# Patient Record
Sex: MALE | Race: WHITE | NOT HISPANIC OR LATINO | URBAN - NONMETROPOLITAN AREA
[De-identification: names, ages, dates, MRNs, and addresses within clinical notes are randomized per-mention and may not be internally consistent; named-entity substitution may affect disease eponyms.]

---

## 2020-04-27 ENCOUNTER — LAB REQUISITION (OUTPATIENT)
Dept: LAB | Facility: HOSPITAL | Age: 25
End: 2020-04-27

## 2020-04-27 DIAGNOSIS — R50.9 FEVER, UNSPECIFIED: ICD-10-CM

## 2020-04-27 PROCEDURE — U0002 COVID-19 LAB TEST NON-CDC: HCPCS | Performed by: INTERNAL MEDICINE

## 2020-04-28 LAB
REF LAB TEST METHOD: NORMAL
SARS-COV-2 RNA RESP QL NAA+PROBE: NOT DETECTED

## 2022-01-20 ENCOUNTER — TELEPHONE (OUTPATIENT)
Dept: EMERGENCY DEPT | Facility: HOSPITAL | Age: 27
End: 2022-01-20

## 2022-01-20 ENCOUNTER — HOSPITAL ENCOUNTER (EMERGENCY)
Facility: HOSPITAL | Age: 27
Discharge: HOME | End: 2022-01-20
Attending: EMERGENCY MEDICINE | Admitting: EMERGENCY MEDICINE

## 2022-01-20 ENCOUNTER — APPOINTMENT (OUTPATIENT)
Dept: GENERAL RADIOLOGY | Facility: HOSPITAL | Age: 27
End: 2022-01-20

## 2022-01-20 VITALS
DIASTOLIC BLOOD PRESSURE: 73 MMHG | OXYGEN SATURATION: 94 % | HEIGHT: 68 IN | WEIGHT: 270 LBS | RESPIRATION RATE: 20 BRPM | BODY MASS INDEX: 40.92 KG/M2 | TEMPERATURE: 98.7 F | HEART RATE: 75 BPM | SYSTOLIC BLOOD PRESSURE: 96 MMHG

## 2022-01-20 DIAGNOSIS — R11.2 NON-INTRACTABLE VOMITING WITH NAUSEA, UNSPECIFIED VOMITING TYPE: ICD-10-CM

## 2022-01-20 DIAGNOSIS — R07.9 CHEST PAIN, UNSPECIFIED TYPE: Primary | ICD-10-CM

## 2022-01-20 DIAGNOSIS — E87.6 HYPOKALEMIA: ICD-10-CM

## 2022-01-20 LAB
ALBUMIN SERPL-MCNC: 4.5 G/DL (ref 3.5–5.2)
ALBUMIN/GLOB SERPL: 1.2 G/DL
ALP SERPL-CCNC: 78 U/L (ref 39–117)
ALT SERPL W P-5'-P-CCNC: 12 U/L (ref 1–41)
ANION GAP SERPL CALCULATED.3IONS-SCNC: 23.5 MMOL/L (ref 5–15)
AST SERPL-CCNC: 16 U/L (ref 1–40)
BASOPHILS # BLD AUTO: 0.07 10*3/MM3 (ref 0–0.2)
BASOPHILS NFR BLD AUTO: 0.7 % (ref 0–1.5)
BILIRUB SERPL-MCNC: 1 MG/DL (ref 0–1.2)
BUN SERPL-MCNC: 9 MG/DL (ref 6–20)
BUN/CREAT SERPL: 11.8 (ref 7–25)
CALCIUM SPEC-SCNC: 10 MG/DL (ref 8.6–10.5)
CHLORIDE SERPL-SCNC: 90 MMOL/L (ref 98–107)
CO2 SERPL-SCNC: 21.5 MMOL/L (ref 22–29)
CREAT SERPL-MCNC: 0.76 MG/DL (ref 0.76–1.27)
D DIMER PPP FEU-MCNC: 0.27 MCGFEU/ML (ref 0–0.57)
DEPRECATED RDW RBC AUTO: 40.4 FL (ref 37–54)
EOSINOPHIL # BLD AUTO: 0.03 10*3/MM3 (ref 0–0.4)
EOSINOPHIL NFR BLD AUTO: 0.3 % (ref 0.3–6.2)
ERYTHROCYTE [DISTWIDTH] IN BLOOD BY AUTOMATED COUNT: 14 % (ref 12.3–15.4)
FLUAV RNA RESP QL NAA+PROBE: NOT DETECTED
FLUBV RNA RESP QL NAA+PROBE: NOT DETECTED
GFR SERPL CREATININE-BSD FRML MDRD: 124 ML/MIN/1.73
GLOBULIN UR ELPH-MCNC: 3.8 GM/DL
GLUCOSE SERPL-MCNC: 95 MG/DL (ref 65–99)
HCT VFR BLD AUTO: 48.3 % (ref 37.5–51)
HGB BLD-MCNC: 17.2 G/DL (ref 13–17.7)
HOLD SPECIMEN: NORMAL
HOLD SPECIMEN: NORMAL
IMM GRANULOCYTES # BLD AUTO: 0.03 10*3/MM3 (ref 0–0.05)
IMM GRANULOCYTES NFR BLD AUTO: 0.3 % (ref 0–0.5)
LIPASE SERPL-CCNC: 90 U/L (ref 13–60)
LYMPHOCYTES # BLD AUTO: 1.08 10*3/MM3 (ref 0.7–3.1)
LYMPHOCYTES NFR BLD AUTO: 11.4 % (ref 19.6–45.3)
MAGNESIUM SERPL-MCNC: 2 MG/DL (ref 1.6–2.6)
MCH RBC QN AUTO: 29.2 PG (ref 26.6–33)
MCHC RBC AUTO-ENTMCNC: 35.6 G/DL (ref 31.5–35.7)
MCV RBC AUTO: 81.9 FL (ref 79–97)
MONOCYTES # BLD AUTO: 0.97 10*3/MM3 (ref 0.1–0.9)
MONOCYTES NFR BLD AUTO: 10.3 % (ref 5–12)
NEUTROPHILS NFR BLD AUTO: 7.27 10*3/MM3 (ref 1.7–7)
NEUTROPHILS NFR BLD AUTO: 77 % (ref 42.7–76)
NRBC BLD AUTO-RTO: 0 /100 WBC (ref 0–0.2)
PLATELET # BLD AUTO: 278 10*3/MM3 (ref 140–450)
PMV BLD AUTO: 11 FL (ref 6–12)
POTASSIUM SERPL-SCNC: 2.9 MMOL/L (ref 3.5–5.2)
PROT SERPL-MCNC: 8.3 G/DL (ref 6–8.5)
RBC # BLD AUTO: 5.9 10*6/MM3 (ref 4.14–5.8)
SARS-COV-2 RNA RESP QL NAA+PROBE: NOT DETECTED
SODIUM SERPL-SCNC: 135 MMOL/L (ref 136–145)
TROPONIN T SERPL-MCNC: <0.01 NG/ML (ref 0–0.03)
TROPONIN T SERPL-MCNC: <0.01 NG/ML (ref 0–0.03)
WBC NRBC COR # BLD: 9.45 10*3/MM3 (ref 3.4–10.8)
WHOLE BLOOD HOLD SPECIMEN: NORMAL
WHOLE BLOOD HOLD SPECIMEN: NORMAL

## 2022-01-20 PROCEDURE — 84484 ASSAY OF TROPONIN QUANT: CPT | Performed by: PHYSICIAN ASSISTANT

## 2022-01-20 PROCEDURE — 85379 FIBRIN DEGRADATION QUANT: CPT | Performed by: PHYSICIAN ASSISTANT

## 2022-01-20 PROCEDURE — 85025 COMPLETE CBC W/AUTO DIFF WBC: CPT

## 2022-01-20 PROCEDURE — 93005 ELECTROCARDIOGRAM TRACING: CPT

## 2022-01-20 PROCEDURE — 83735 ASSAY OF MAGNESIUM: CPT | Performed by: PHYSICIAN ASSISTANT

## 2022-01-20 PROCEDURE — 93005 ELECTROCARDIOGRAM TRACING: CPT | Performed by: PHYSICIAN ASSISTANT

## 2022-01-20 PROCEDURE — 80053 COMPREHEN METABOLIC PANEL: CPT

## 2022-01-20 PROCEDURE — 96374 THER/PROPH/DIAG INJ IV PUSH: CPT

## 2022-01-20 PROCEDURE — 71045 X-RAY EXAM CHEST 1 VIEW: CPT

## 2022-01-20 PROCEDURE — 25010000002 ONDANSETRON PER 1 MG: Performed by: PHYSICIAN ASSISTANT

## 2022-01-20 PROCEDURE — 36415 COLL VENOUS BLD VENIPUNCTURE: CPT

## 2022-01-20 PROCEDURE — 84484 ASSAY OF TROPONIN QUANT: CPT

## 2022-01-20 PROCEDURE — 99284 EMERGENCY DEPT VISIT MOD MDM: CPT

## 2022-01-20 PROCEDURE — 83690 ASSAY OF LIPASE: CPT | Performed by: PHYSICIAN ASSISTANT

## 2022-01-20 PROCEDURE — 87636 SARSCOV2 & INF A&B AMP PRB: CPT | Performed by: PHYSICIAN ASSISTANT

## 2022-01-20 RX ORDER — ONDANSETRON 2 MG/ML
4 INJECTION INTRAMUSCULAR; INTRAVENOUS ONCE
Status: COMPLETED | OUTPATIENT
Start: 2022-01-20 | End: 2022-01-20

## 2022-01-20 RX ORDER — POTASSIUM CHLORIDE 750 MG/1
40 CAPSULE, EXTENDED RELEASE ORAL ONCE
Status: COMPLETED | OUTPATIENT
Start: 2022-01-20 | End: 2022-01-20

## 2022-01-20 RX ORDER — SODIUM CHLORIDE 0.9 % (FLUSH) 0.9 %
10 SYRINGE (ML) INJECTION AS NEEDED
Status: DISCONTINUED | OUTPATIENT
Start: 2022-01-20 | End: 2022-01-20 | Stop reason: HOSPADM

## 2022-01-20 RX ORDER — ONDANSETRON 4 MG/1
4 TABLET, ORALLY DISINTEGRATING ORAL EVERY 6 HOURS PRN
Qty: 8 TABLET | Refills: 0 | Status: SHIPPED | OUTPATIENT
Start: 2022-01-20 | End: 2022-01-22

## 2022-01-20 RX ORDER — FAMOTIDINE 20 MG/1
20 TABLET, FILM COATED ORAL DAILY PRN
COMMUNITY

## 2022-01-20 RX ORDER — ASPIRIN 325 MG
325 TABLET ORAL ONCE
Status: COMPLETED | OUTPATIENT
Start: 2022-01-20 | End: 2022-01-20

## 2022-01-20 RX ADMIN — SODIUM CHLORIDE 1000 ML: 9 INJECTION, SOLUTION INTRAVENOUS at 14:39

## 2022-01-20 RX ADMIN — ONDANSETRON 4 MG: 2 INJECTION INTRAMUSCULAR; INTRAVENOUS at 14:38

## 2022-01-20 RX ADMIN — POTASSIUM CHLORIDE 40 MEQ: 10 CAPSULE, COATED, EXTENDED RELEASE ORAL at 15:37

## 2022-01-20 RX ADMIN — ASPIRIN 325 MG ORAL TABLET 325 MG: 325 PILL ORAL at 14:38

## 2022-01-20 RX ADMIN — LIDOCAINE HYDROCHLORIDE: 20 SOLUTION ORAL; TOPICAL at 15:39

## 2022-01-20 NOTE — ED PROVIDER NOTES
Subjective   Patient is a 27-year-old male with reported history of scoliosis presenting to the ER for evaluation of chest pain.  Patient states he has had nausea and vomiting for approximately 1 week.  He states that since last night he has had a dull pain with intermittent sharp pains in his left chest that radiates to his left arm and neck.  He states it has been more constant throughout the day while he is been awake.  He states he has been a bit short of breath as well but denies cough.  He denies any fever, chills, headache, syncopal episodes, leg pain or swelling.  He states he has had some abdominal discomfort but no specific focal pain.  Denies any dysuria, hematuria.  He denies any tobacco use.  He states he does have a positive family history of CAD in his father who has had heart attacks before 65.          Review of Systems   Constitutional: Negative for chills and fever.   HENT: Negative.    Eyes: Negative.    Respiratory: Positive for shortness of breath. Negative for cough.    Cardiovascular: Positive for chest pain. Negative for palpitations and leg swelling.   Gastrointestinal: Positive for nausea and vomiting. Negative for abdominal pain.   Genitourinary: Negative.    Musculoskeletal: Negative.    Skin: Negative.    Allergic/Immunologic: Negative for immunocompromised state.   Neurological: Negative.    Psychiatric/Behavioral: Negative.        Past Medical History:   Diagnosis Date   • Scoliosis        Allergies   Allergen Reactions   • Penicillins Rash       Past Surgical History:   Procedure Laterality Date   • EAR TUBES         History reviewed. No pertinent family history.    Social History     Socioeconomic History   • Marital status: Single   Tobacco Use   • Smoking status: Former Smoker   • Smokeless tobacco: Never Used   Vaping Use   • Vaping Use: Never used   Substance and Sexual Activity   • Alcohol use: Not Currently   • Drug use: Never   • Sexual activity: Defer           Objective  "  Physical Exam  Vitals and nursing note reviewed.     BP 96/73   Pulse 75   Temp 98.7 °F (37.1 °C) (Oral)   Resp 20   Ht 172.7 cm (68\")   Wt 122 kg (270 lb)   SpO2 94%   BMI 41.05 kg/m²     GEN: No acute distress, sitting up around the stretcher.  Awake and alert.  Does not appear septic or toxic.  He is answering questions appropriately.  Head: Normocephalic, atraumatic  Eyes: EOM intact  ENT: Mask in place per protocol  Cardiovascular: Mild tachycardia, regular rhythm  Lungs: Clear to auscultation bilaterally without adventitious sounds  Abdomen: Soft, nontender, nondistended, no peritoneal signs, no focal guarding  Extremities: No edema, normal appearance, full range of motion without deficits  Neuro: GCS 15  Psych: Mood and affect are appropriate    Procedures           ED Course  ED Course as of 01/20/22 2218   Thu Jan 20, 2022   1442 WBC: 9.45 [LA]   1442 Hemoglobin: 17.2 [LA]   1442 Platelets: 278 [LA]   1442 Neutrophil Rel %(!): 77.0 [LA]   1442 Lymphocyte Rel %(!): 11.4 [LA]   1458 D-Dimer, Quant: 0.27 [LA]   1501 EKG interpreted by me.  Sinus rhythm.  Tachycardic.  Rate of 105.  Sinus arrhythmia.  Left anterior fascicular block.  Nonspecific ST segment depressions and T wave inversions.  Diffuse.  Nonspecific ST segment abnormalities.  Abnormal EKG [CG]   1501 Magnesium: 2.0 [LA]   1502 Glucose: 95 [LA]   1502 BUN: 9 [LA]   1502 Creatinine: 0.76 [LA]   1502 Sodium(!): 135 [LA]   1502 Potassium(!): 2.9 [LA]   1502 Chloride(!): 90 [LA]   1502 CO2(!): 21.5 [LA]   1502 Calcium: 10.0 [LA]   1502 Total Protein: 8.3 [LA]   1502 Albumin: 4.50 [LA]   1502 ALT (SGPT): 12 [LA]   1502 AST (SGOT): 16 [LA]   1502 Alkaline Phosphatase: 78 [LA]   1502 Total Bilirubin: 1.0 [LA]   1502 eGFR Non  Am: 124 [LA]   1502 Globulin: 3.8 [LA]   1502 A/G Ratio: 1.2 [LA]   1502 BUN/Creatinine Ratio: 11.8 [LA]   1502 Anion Gap(!): 23.5 [LA]   1505 COVID19: Not Detected [LA]   1505 Influenza A PCR: Not Detected [LA] " "  1505 Influenza B PCR: Not Detected [LA]   1523 PROCEDURE: XR CHEST 1 VW-     HISTORY: Chest Pain Triage Protocol     COMPARISON: None.     FINDINGS: The heart is normal in size. The mediastinum is unremarkable.  The lungs are clear. There is no pneumothorax.  There are no acute  osseous abnormalities.     IMPRESSION:  No acute cardiopulmonary process.     Continued followup is recommended.     This report was finalized on 1/20/2022 3:16 PM by Michelle Yo M.D..          Specimen Collected: 01/20/22 15:16         [LA]   1534 Updated patient with findings so far. He is resting comfortably in the stretcher.  [LA]   1709 Lipase(!): 90 [LA]   1709 Troponin T: <0.010 [LA]   1731 Reassessed the patient. Chest pain has improved after medication. Discussed with Dr. Ward. Called Dr. Palacios. He is going to review the EKGs and call me back. [LA]   1746 Talked with Dr. Palacios.  He asked if patient had a D-dimer and then also asked if he had a CT of his abdomen.  He states he has seen this type of EKG with gallbladder pancreatitis. [LA]   1750 Discussed findings with the patient.  He states he actually had a CT scan of his abdomen performed at [place] a few days ago.  He states that they told him there were no abnormalities.  Given this, discussed diet changes, will give Zofran.  Discussed follow-up with cardiology and primary care provider. [LA]   1758 CT scan report from 1/17/2022 from [facility] revealed \"Impression: no acute abnormality\"  [LA]   1800 HEART score is 3, low risk [LA]      ED Course User Index  [CG] Randall Ward B, DO  [LA] Chelsea Szymanski PA-C                                                 MDM  Number of Diagnoses or Management Options  Chest pain, unspecified type  Hypokalemia  Non-intractable vomiting with nausea, unspecified vomiting type  Diagnosis management comments: On arrival, patient has a mildly elevated blood pressure, is afebrile.  Saturating 95% on room air and is mildly " tachycardic at 103.  Differential could include electrolyte abnormalities, dehydration, ACS, cardiac dysrhythmia, pneumonia, and other concerns.  Lower concern for pulmonary embolism but given the tachycardia will obtain D-dimer, basic labs, troponin, magnesium, EKG, chest x-ray.  He could also have some underlying esophagitis from the vomiting.  Will obtain rapid COVID and influenza.  Patient was given aspirin on arrival.  We'll also give IV fluids and Zofran, GI cocktail.    EKG was interpreted by the attending.  Initial troponin negative.  Patient had a mild elevation of lipase at 90 and a mild hypokalemia that was replaced p.o.  There were no other significant electrolyte abnormalities.  Repeat EKG was performed.  Serial troponin remained negative.  Patient's pain did improve somewhat after GI cocktail.  Given the abnormal appearance of patient's EKG, was able to speak with our cardiologist Dr. Palacios and he reviewed the EKG.  He asked about a D-dimer the patient which was negative.  He states he is also seen this with gallstone pancreatitis but did not think there was any further cardiac work-up warranted at this time.  Discussed with the patient and he states he just had a CT scan performed [place] a few days ago.  We obtained outside records of a CT scan with contrast from 1/17/2022 that showed no acute abnormalities.  Given this, believe patient can be discharged with very close follow-up and strict return precautions.  He verbalized understanding and was in agreement with this plan of care.       Amount and/or Complexity of Data Reviewed  Clinical lab tests: reviewed and ordered  Tests in the radiology section of CPT®: reviewed and ordered  Discussion of test results with the performing providers: yes  Review and summarize past medical records: yes  Discuss the patient with other providers: yes    Risk of Complications, Morbidity, and/or Mortality  Presenting problems: moderate  Diagnostic procedures:  moderate  Management options: low    Patient Progress  Patient progress: stable      Final diagnoses:   Chest pain, unspecified type   Non-intractable vomiting with nausea, unspecified vomiting type   Hypokalemia       ED Disposition  ED Disposition     ED Disposition Condition Comment    Discharge Stable           PATIENT CONNECTION - [facility] [address]  [phone]  Schedule an appointment as soon as possible for a visit       Luca Palacios MD  [address] [place] [address] 1  [address]  [facility] [id number] [phone]    Schedule an appointment as soon as possible for a visit   As needed, If symptoms worsen         Medication List      New Prescriptions    ondansetron ODT 4 MG disintegrating tablet  Commonly known as: ZOFRAN-ODT  Place 1 tablet on the tongue Every 6 (Six) Hours As Needed for Nausea or Vomiting for up to 2 days.           Where to Get Your Medications      These medications were sent to [facility] - [place], [place] - [address] - [phone]  - [phone]   [address] [place] [address]    Phone: [phone]   · ondansetron ODT 4 MG disintegrating tablet          Chelsea Szymanski PA-C  01/20/22 2217       Chelsea Szymanski PA-C  01/20/22 2212

## 2022-01-20 NOTE — ED NOTES
At this time BAILEY Tran requested to speak to Dr. Palacios. Call transferred at this time.     Kaylin Sánchez  01/20/22 0156

## 2022-01-20 NOTE — ED NOTES
Pt received discharge instructions and verbalized understanding; Breathing even and non labored with no signs of distress; AOx4; GCS 15; Pt ambulated off unit with steady gait      Violet Ware RN  01/20/22 4163

## 2022-01-20 NOTE — DISCHARGE INSTRUCTIONS
Try to eat a bland diet for the next few days.  Try to avoid greasy, fried, or fatty foods.  Take Zofran as needed for nausea and vomiting.  Follow-up with your primary care provider in the next few days to reevaluate symptoms and ensure you are improving.  Your potassium was a bit low today but this was replaced still need to be rechecked.  You can follow-up with our cardiologist as needed especially symptoms persist or worsen.  They might need to do further evaluation as an outpatient.  Return here to the ER for any change, worsening symptoms, or any additional concerns including but not limited to severe or worsening chest pain, shortness of breath, intractable vomiting, fever greater than 100.4, severe abdominal pain.

## 2022-01-21 NOTE — PROGRESS NOTES
Patient: Milton Taylor    YOB: 1995    Date: 01/24/2022    Primary Care Provider: Lakeshia Yap MD    Chief Complaint   Patient presents with   • Abdominal Pain       SUBJECTIVE:    History of present illness:  I saw the patient in the office today as a consultation for evaluation and treatment of bouts of nausea and vomiting with unexplained weight loss and recent change in bowel habits.  Patient had CT scan of the abdomen and pelvis performed 01/03/2022, it did not show an acute abnormality other than scoliosis. Patient states he has nausea, vomiting, and constipation. He states that he has lost 90 pounds since September. He is unable to keep any food or liquids down with vomiting.    Apparently was seen in the emergency room 4 days ago, he did have a very slightly elevated lipase at that time.  He does complain of sharp discomfort in the midepigastric and right upper quadrant region associated with nausea, present over the past several weeks to months, worse with fatty meals, not relieved, associated with nausea and vomiting, nonradiating.    The following portions of the patient's history were reviewed and updated as appropriate: allergies, current medications, past family history, past medical history, past social history, past surgical history and problem list.    Review of Systems   Constitutional: Positive for unexpected weight change. Negative for chills and fever.   HENT: Negative for trouble swallowing and voice change.    Eyes: Negative for visual disturbance.   Respiratory: Negative for apnea, cough, chest tightness, shortness of breath and wheezing.    Cardiovascular: Negative for chest pain, palpitations and leg swelling.   Gastrointestinal: Positive for constipation, nausea and vomiting. Negative for abdominal distention, abdominal pain, anal bleeding, blood in stool, diarrhea and rectal pain.   Endocrine: Negative for cold intolerance and heat intolerance.   Genitourinary:  "Negative for difficulty urinating, dysuria, flank pain, scrotal swelling and testicular pain.   Musculoskeletal: Negative for back pain, gait problem and joint swelling.   Skin: Negative for color change, rash and wound.   Neurological: Negative for dizziness, syncope, speech difficulty, weakness, numbness and headaches.   Hematological: Negative for adenopathy. Does not bruise/bleed easily.   Psychiatric/Behavioral: Negative for confusion. The patient is not nervous/anxious.        History:  Past Medical History:   Diagnosis Date   • Scoliosis        Past Surgical History:   Procedure Laterality Date   • EAR TUBES         Family History   Problem Relation Age of Onset   • Hyperlipidemia Father    • Heart disease Father    • Cancer Maternal Grandfather         pancreatic       Social History     Tobacco Use   • Smoking status: Former Smoker   • Smokeless tobacco: Never Used   Vaping Use   • Vaping Use: Never used   Substance Use Topics   • Alcohol use: Not Currently   • Drug use: Never       Allergies:  Allergies   Allergen Reactions   • Penicillins Rash       Medications:    Current Outpatient Medications:   •  Famotidine (PEPCID PO), Take  by mouth., Disp: , Rfl:   •  nystatin (MYCOSTATIN) 100,000 unit/mL suspension, take FIVE ML BY MOUTH FOUR TIMES DAILY, Disp: , Rfl:   •  potassium chloride (K-DUR,KLOR-CON) 20 MEQ CR tablet, TAKE ONE TABLET BY MOUTH TWICE DAILY WITH A FULL GLASS OF WATER, Disp: , Rfl:   •  Promethegan 25 MG suppository, insert ONE SUPPOSITORY rectally EVERY 6 HOURS AS NEEDED FOR NAUSEA AND VOMITING, Disp: , Rfl:   •  NYSTATIN PO, Take  by mouth., Disp: , Rfl:   No current facility-administered medications for this visit.    OBJECTIVE:    Vital Signs:   Vitals:    01/24/22 1029   BP: 112/64   Pulse: (!) 124   Resp: 18   Temp: 98 °F (36.7 °C)   TempSrc: Temporal   SpO2: 98%   Weight: 120 kg (265 lb 6.4 oz)   Height: 172.7 cm (68\")       Physical Exam:   General Appearance:    Alert, cooperative, " in no acute distress   Head:    Normocephalic, without obvious abnormality, atraumatic   Eyes:            Lids and lashes normal, conjunctivae and sclerae normal, no   icterus, no pallor, corneas clear, PERRLA   Ears:    Ears appear intact with no abnormalities noted   Throat:   No oral lesions, no thrush, oral mucosa moist   Neck:   No adenopathy, supple, trachea midline, no thyromegaly, no   carotid bruit, no JVD   Lungs:     Clear to auscultation,respirations regular, even and                  unlabored    Heart:    Regular rhythm and normal rate, normal S1 and S2, no            murmur, no gallop, no rub, no click   Chest Wall:    No abnormalities observed   Abdomen:     Normal bowel sounds, no masses, no organomegaly, soft        non-tender, non-distended, no guarding, there is evidence of epigastric  tenderness, no peritoneal signs   Extremities:   Moves all extremities well, no edema, no cyanosis, no             redness   Pulses:   Pulses palpable and equal bilaterally   Skin:   No bleeding, bruising or rash   Lymph nodes:   No palpable adenopathy   Neurologic:   Cranial nerves 2 - 12 grossly intact, sensation intact     Results Review:   I reviewed the patient's new clinical results.  I reviewed the patient's new imaging results and agree with the interpretation.  I reviewed the patient's other test results and agree with the interpretation    Review of Systems was reviewed and confirmed as accurate as documented by the MA.    ASSESSMENT/PLAN:    1. Nausea and vomiting, intractability of vomiting not specified, unspecified vomiting type    2. Epigastric pain    3. Right upper quadrant abdominal pain        I did have a detailed and extensive discussion with the patient and his mother in the office today and I would like for the patient to have repeat laboratory values and a HIDA scan performed and then I want to see him back in the office ASAP.    I discussed the patients findings and my recommendations with  patient.     Electronically signed by Nj De Jesus MD  01/24/22 15:49 EST

## 2022-01-24 ENCOUNTER — LAB (OUTPATIENT)
Dept: LAB | Facility: HOSPITAL | Age: 27
End: 2022-01-24

## 2022-01-24 ENCOUNTER — TELEPHONE (OUTPATIENT)
Dept: SURGERY | Facility: CLINIC | Age: 27
End: 2022-01-24

## 2022-01-24 ENCOUNTER — OFFICE VISIT (OUTPATIENT)
Dept: SURGERY | Facility: CLINIC | Age: 27
End: 2022-01-24

## 2022-01-24 ENCOUNTER — HOSPITAL ENCOUNTER (OUTPATIENT)
Dept: NUCLEAR MEDICINE | Facility: HOSPITAL | Age: 27
Discharge: HOME | End: 2022-01-24

## 2022-01-24 VITALS
HEART RATE: 124 BPM | SYSTOLIC BLOOD PRESSURE: 112 MMHG | RESPIRATION RATE: 18 BRPM | BODY MASS INDEX: 40.22 KG/M2 | DIASTOLIC BLOOD PRESSURE: 64 MMHG | TEMPERATURE: 98 F | OXYGEN SATURATION: 98 % | WEIGHT: 265.4 LBS | HEIGHT: 68 IN

## 2022-01-24 DIAGNOSIS — R11.2 NAUSEA AND VOMITING, INTRACTABILITY OF VOMITING NOT SPECIFIED, UNSPECIFIED VOMITING TYPE: ICD-10-CM

## 2022-01-24 DIAGNOSIS — R10.11 RIGHT UPPER QUADRANT ABDOMINAL PAIN: ICD-10-CM

## 2022-01-24 DIAGNOSIS — R11.2 NAUSEA AND VOMITING, INTRACTABILITY OF VOMITING NOT SPECIFIED, UNSPECIFIED VOMITING TYPE: Primary | ICD-10-CM

## 2022-01-24 DIAGNOSIS — R10.13 EPIGASTRIC PAIN: ICD-10-CM

## 2022-01-24 LAB
ALBUMIN SERPL-MCNC: 4.3 G/DL (ref 3.5–5.2)
ALBUMIN/GLOB SERPL: 1.1 G/DL
ALP SERPL-CCNC: 78 U/L (ref 39–117)
ALT SERPL W P-5'-P-CCNC: 14 U/L (ref 1–41)
ANION GAP SERPL CALCULATED.3IONS-SCNC: 22.1 MMOL/L (ref 5–15)
AST SERPL-CCNC: 24 U/L (ref 1–40)
BILIRUB SERPL-MCNC: 1 MG/DL (ref 0–1.2)
BUN SERPL-MCNC: 8 MG/DL (ref 6–20)
BUN/CREAT SERPL: 10.1 (ref 7–25)
CALCIUM SPEC-SCNC: 10 MG/DL (ref 8.6–10.5)
CHLORIDE SERPL-SCNC: 89 MMOL/L (ref 98–107)
CO2 SERPL-SCNC: 23.9 MMOL/L (ref 22–29)
CREAT SERPL-MCNC: 0.79 MG/DL (ref 0.76–1.27)
DEPRECATED RDW RBC AUTO: 39.6 FL (ref 37–54)
ERYTHROCYTE [DISTWIDTH] IN BLOOD BY AUTOMATED COUNT: 13.8 % (ref 12.3–15.4)
GFR SERPL CREATININE-BSD FRML MDRD: 119 ML/MIN/1.73
GLOBULIN UR ELPH-MCNC: 3.9 GM/DL
GLUCOSE SERPL-MCNC: 94 MG/DL (ref 65–99)
HCT VFR BLD AUTO: 49.1 % (ref 37.5–51)
HGB BLD-MCNC: 17.3 G/DL (ref 13–17.7)
LIPASE SERPL-CCNC: 163 U/L (ref 13–60)
MCH RBC QN AUTO: 28.9 PG (ref 26.6–33)
MCHC RBC AUTO-ENTMCNC: 35.2 G/DL (ref 31.5–35.7)
MCV RBC AUTO: 82.1 FL (ref 79–97)
PLATELET # BLD AUTO: 271 10*3/MM3 (ref 140–450)
PMV BLD AUTO: 11.3 FL (ref 6–12)
POTASSIUM SERPL-SCNC: 3.2 MMOL/L (ref 3.5–5.2)
PROT SERPL-MCNC: 8.2 G/DL (ref 6–8.5)
RBC # BLD AUTO: 5.98 10*6/MM3 (ref 4.14–5.8)
SODIUM SERPL-SCNC: 135 MMOL/L (ref 136–145)
WBC NRBC COR # BLD: 8.1 10*3/MM3 (ref 3.4–10.8)

## 2022-01-24 PROCEDURE — 80053 COMPREHEN METABOLIC PANEL: CPT

## 2022-01-24 PROCEDURE — 36415 COLL VENOUS BLD VENIPUNCTURE: CPT

## 2022-01-24 PROCEDURE — 78227 HEPATOBIL SYST IMAGE W/DRUG: CPT

## 2022-01-24 PROCEDURE — 83690 ASSAY OF LIPASE: CPT

## 2022-01-24 PROCEDURE — A9537 TC99M MEBROFENIN: HCPCS | Performed by: SURGERY

## 2022-01-24 PROCEDURE — 99204 OFFICE O/P NEW MOD 45 MIN: CPT | Performed by: SURGERY

## 2022-01-24 PROCEDURE — 0 TECHNETIUM TC 99M MEBROFENIN KIT: Performed by: SURGERY

## 2022-01-24 PROCEDURE — 85027 COMPLETE CBC AUTOMATED: CPT

## 2022-01-24 RX ORDER — PROMETHAZINE HYDROCHLORIDE 25 MG/1
SUPPOSITORY RECTAL
COMMUNITY
Start: 2022-01-04 | End: 2022-01-25 | Stop reason: ALTCHOICE

## 2022-01-24 RX ORDER — KIT FOR THE PREPARATION OF TECHNETIUM TC 99M MEBROFENIN 45 MG/10ML
1 INJECTION, POWDER, LYOPHILIZED, FOR SOLUTION INTRAVENOUS
Status: COMPLETED | OUTPATIENT
Start: 2022-01-24 | End: 2022-01-24

## 2022-01-24 RX ORDER — POTASSIUM CHLORIDE 20 MEQ/1
TABLET, EXTENDED RELEASE ORAL
COMMUNITY
Start: 2022-01-17

## 2022-01-24 RX ADMIN — MEBROFENIN 1 DOSE: 45 INJECTION, POWDER, LYOPHILIZED, FOR SOLUTION INTRAVENOUS at 12:00

## 2022-01-24 NOTE — TELEPHONE ENCOUNTER
"Per Dr. De Jesus, pt was informed that his hida scan was abnormally low indicating that his gallbladder is not working properly.  Patient asked to stay on \"sips of liquids\" and keep appt for tomorrow.  Patient stated that he would keep appt and he added that he had both Zofran and Phenergan on hand and he has taken the Phenergan and it is helping with his nausea and vomiting.  "

## 2022-01-24 NOTE — H&P (VIEW-ONLY)
Patient: Milton Taylor    YOB: 1995    Date: 01/25/2022    Primary Care Provider: Lakeshia Yap MD    Chief Complaint   Patient presents with   • Follow-up     Hida scan/labs       SUBJECTIVE:    History of present illness:  I saw the patient in the office today as a follow-up consultation for evaluation and treatment of abdominal pain with nausea.  Patient had lab work done 01/24/2022 including lipase which was elevated @ 163 U/L.  CBC and CMP were done also, please see attached documents for complete reports. Hida scan was done 01/24/2022 which showed a 0% ejection fraction.    Apparently the patient has been ill for several days, he has excessive nausea and vomiting and was seen in the emergency room.  He complains of sharp discomfort associated with this nausea and vomiting, this is located in the upper quadrant in the epigastric region, present over the past several days, associated with nausea and vomiting, worse with meals, not improved.    The following portions of the patient's history were reviewed and updated as appropriate: allergies, current medications, past family history, past medical history, past social history, past surgical history and problem list.    Review of Systems   Constitutional: Negative for chills, fever and unexpected weight change.   HENT: Negative for trouble swallowing and voice change.    Eyes: Negative for visual disturbance.   Respiratory: Negative for apnea, cough, chest tightness, shortness of breath and wheezing.    Cardiovascular: Negative for chest pain, palpitations and leg swelling.   Gastrointestinal: Positive for abdominal distention, abdominal pain, nausea and vomiting. Negative for anal bleeding, blood in stool, constipation, diarrhea and rectal pain.   Endocrine: Negative for cold intolerance and heat intolerance.   Genitourinary: Negative for difficulty urinating, dysuria, flank pain, scrotal swelling and testicular pain.   Musculoskeletal:  "Negative for back pain, gait problem and joint swelling.   Skin: Negative for color change, rash and wound.   Neurological: Negative for dizziness, syncope, speech difficulty, weakness, numbness and headaches.   Hematological: Negative for adenopathy. Does not bruise/bleed easily.   Psychiatric/Behavioral: Negative for confusion. The patient is not nervous/anxious.        History:  Past Medical History:   Diagnosis Date   • Scoliosis        Past Surgical History:   Procedure Laterality Date   • EAR TUBES         Family History   Problem Relation Age of Onset   • Hyperlipidemia Father    • Heart disease Father    • Cancer Maternal Grandfather         pancreatic       Social History     Tobacco Use   • Smoking status: Former Smoker   • Smokeless tobacco: Never Used   Vaping Use   • Vaping Use: Never used   Substance Use Topics   • Alcohol use: Not Currently   • Drug use: Never       Allergies:  Allergies   Allergen Reactions   • Penicillins Rash       Medications:    Current Outpatient Medications:   •  Famotidine (PEPCID PO), Take  by mouth., Disp: , Rfl:   •  nystatin (MYCOSTATIN) 100,000 unit/mL suspension, take FIVE ML BY MOUTH FOUR TIMES DAILY, Disp: , Rfl:   •  NYSTATIN PO, Take  by mouth., Disp: , Rfl:   •  potassium chloride (K-DUR,KLOR-CON) 20 MEQ CR tablet, TAKE ONE TABLET BY MOUTH TWICE DAILY WITH A FULL GLASS OF WATER, Disp: , Rfl:   •  Promethegan 25 MG suppository, insert ONE SUPPOSITORY rectally EVERY 6 HOURS AS NEEDED FOR NAUSEA AND VOMITING, Disp: , Rfl:     OBJECTIVE:    Vital Signs:   Vitals:    01/25/22 1456   BP: 120/62   Pulse: (!) 139   Resp: 18   Temp: 97.1 °F (36.2 °C)   TempSrc: Temporal   SpO2: 99%   Weight: 119 kg (263 lb)   Height: 172.7 cm (68\")       Physical Exam:   General Appearance:    Alert, cooperative, in no acute distress   Head:    Normocephalic, without obvious abnormality, atraumatic   Eyes:            Lids and lashes normal, conjunctivae and sclerae normal, no   icterus, no " pallor, corneas clear, PERRLA   Ears:    Ears appear intact with no abnormalities noted   Throat:   No oral lesions, no thrush, oral mucosa moist   Neck:   No adenopathy, supple, trachea midline, no thyromegaly, no   carotid bruit, no JVD   Lungs:     Clear to auscultation,respirations regular, even and                  unlabored    Heart:    Regular rhythm and normal rate, normal S1 and S2, no            murmur   Abdomen:     no masses, no organomegaly, soft non-tender, non-distended, no guarding, there is evidence of right upper quadrant tenderness, no peritoneal signs   Extremities:   Moves all extremities well, no edema, no cyanosis, no             redness   Pulses:   Pulses palpable and equal bilaterally   Skin:   No bleeding, bruising or rash   Lymph nodes:   No palpable adenopathy   Neurologic:   Cranial nerves 2 - 12 grossly intact, sensation intact      Results Review:   I reviewed the patient's new clinical results.  I reviewed the patient's new imaging results and agree with the interpretation.  I reviewed the patient's other test results and agree with the interpretation    Review of Systems was reviewed and confirmed as accurate as documented by the MA.     I did review all his labs and x-ray studies.  HIDA scan did show evidence of a 0% ejection fraction.  I reviewed all his old ER records.    ASSESSMENT/PLAN:    1. Biliary dyskinesia    2. Nausea    3. Right upper quadrant abdominal pain    4. Dehydration        I had a detailed and extensive discussion with the patient and his mother in the office and they understand that they need to undergo laparoscopic cholecystectomy with intraoperative cholangiography or possible open cholecystectomy. Full risks and benefits of operative versus nonoperative intervention were discussed with the patient and these included things such as nonresolution of symptoms and possible worsening of symptoms without surgical intervention versus infection, bleeding, open  cholecystectomy, common bile duct injury, postoperative biliary leakage, need for drain placement, possible inability to perform cholangiography due to inflammation, postoperative abscess, etc with surgical intervention. The patient understands, agrees, and wishes to proceed with the surgical treatment plan as mentioned above. The patient had no questions for me at the end of the discussion.  I did draw a picture of the anatomy for the patient and used this in my informed consent.     I discussed the patients findings and my recommendations with patient and his mother.    The patient does have significant nausea and vomiting and has not been doing well over the last several days.  I think this case is an urgency and needs to be performed despite the recent Covid crisis and the cancellation of elective procedures at the hospital.    Electronically signed by Nj De Jesus MD  01/25/22

## 2022-01-25 ENCOUNTER — PRE-ADMISSION TESTING (OUTPATIENT)
Dept: PREADMISSION TESTING | Facility: HOSPITAL | Age: 27
End: 2022-01-25

## 2022-01-25 ENCOUNTER — OFFICE VISIT (OUTPATIENT)
Dept: SURGERY | Facility: CLINIC | Age: 27
End: 2022-01-25

## 2022-01-25 VITALS
HEART RATE: 139 BPM | RESPIRATION RATE: 18 BRPM | SYSTOLIC BLOOD PRESSURE: 120 MMHG | HEIGHT: 68 IN | TEMPERATURE: 97.1 F | DIASTOLIC BLOOD PRESSURE: 62 MMHG | BODY MASS INDEX: 39.86 KG/M2 | OXYGEN SATURATION: 99 % | WEIGHT: 263 LBS

## 2022-01-25 VITALS — WEIGHT: 264.4 LBS | BODY MASS INDEX: 40.07 KG/M2 | HEIGHT: 68 IN

## 2022-01-25 DIAGNOSIS — K82.8 BILIARY DYSKINESIA: Primary | ICD-10-CM

## 2022-01-25 DIAGNOSIS — E86.0 DEHYDRATION: ICD-10-CM

## 2022-01-25 DIAGNOSIS — R11.0 NAUSEA: ICD-10-CM

## 2022-01-25 DIAGNOSIS — Z01.818 PRE-OP TESTING: Primary | ICD-10-CM

## 2022-01-25 DIAGNOSIS — Z01.818 PRE-OP TESTING: ICD-10-CM

## 2022-01-25 DIAGNOSIS — R10.11 RIGHT UPPER QUADRANT ABDOMINAL PAIN: ICD-10-CM

## 2022-01-25 LAB — SARS-COV-2 RNA PNL SPEC NAA+PROBE: NOT DETECTED

## 2022-01-25 PROCEDURE — C9803 HOPD COVID-19 SPEC COLLECT: HCPCS

## 2022-01-25 PROCEDURE — 87635 SARS-COV-2 COVID-19 AMP PRB: CPT

## 2022-01-25 PROCEDURE — 99213 OFFICE O/P EST LOW 20 MIN: CPT | Performed by: SURGERY

## 2022-01-25 RX ORDER — SODIUM CHLORIDE 9 MG/ML
100 INJECTION, SOLUTION INTRAVENOUS CONTINUOUS
Status: CANCELLED | OUTPATIENT
Start: 2022-01-25

## 2022-01-25 RX ORDER — PROMETHAZINE HYDROCHLORIDE 25 MG/1
25 TABLET ORAL EVERY 6 HOURS PRN
COMMUNITY

## 2022-01-25 NOTE — PAT
Patient seen in PAT for planned procedure 1/26/22 with Dr. De Jesus. Patient was seen in ED on 1/20/22 for c/o chest pain radiating to left arm and left neck. Two EKGs were performed and showed left anterior fasicular blocks. Troponins were negative. The ED consulted Dr. Palacios at that time who thought the pain was related to gallstone pancreatitis and did not think the patient required any further cardiac workup at that time. Andre Palacio CRNA notified. No new orders received.

## 2022-01-25 NOTE — DISCHARGE INSTRUCTIONS
PAT PASS GIVEN/REVIEWED WITH PT.  VERBALIZED UNDERSTANDING OF THE FOLLOWING:  DO NOT EAT, DRINK, SMOKE, USE SMOKELESS TOBACCO OR CHEW GUM AFTER MIDNIGHT THE NIGHT BEFORE SURGERY.  THIS ALSO INCLUDES HARD CANDIES AND MINTS.    DO NOT SHAVE THE AREA TO BE OPERATED ON AT LEAST 48 HOURS PRIOR TO THE PROCEDURE.  DO NOT WEAR MAKE UP OR NAIL POLISH.  DO NOT LEAVE IN ANY PIERCING OR WEAR JEWELRY THE DAY OF SURGERY.      DO NOT USE ADHESIVES IF YOU WEAR DENTURES.    DO NOT WEAR EYE CONTACTS; BRING IN YOUR GLASSES.    ONLY TAKE MEDICATION THE MORNING OF YOUR PROCEDURE IF INSTRUCTED BY YOUR SURGEON WITH ENOUGH WATER TO SWALLOW THE MEDICATION.  IF YOUR SURGEON DID NOT SPECIFY WHICH MEDICATIONS TO TAKE, YOU WILL NEED TO CALL THEIR OFFICE FOR FURTHER INSTRUCTIONS AND DO AS THEY INSTRUCT.    LEAVE ANYTHING YOU CONSIDER VALUABLE AT HOME.    YOU WILL NEED TO ARRANGE FOR SOMEONE TO DRIVE YOU HOME AFTER SURGERY.  IT IS RECOMMENDED THAT YOU DO NOT DRIVE, WORK, DRINK ALCOHOL OR MAKE MAJOR DECISIONS FOR AT LEAST 24 HOURS AFTER YOUR PROCEDURE IS COMPLETE.      THE DAY OF YOUR PROCEDURE, BRING IN THE FOLLOWING IF APPLICABLE:   PICTURE ID AND INSURANCE/MEDICARE OR MEDICAID CARDS/ANY CO-PAY THAT MAY BE DUE   COPY OF ADVANCED DIRECTIVE/LIVING WILL/POWER OR    CPAP/BIPAP/INHALERS   SKIN PREP SHEET   YOUR PREADMISSION TESTING PASS (IF NOT A PHONE HISTORY)            COVID self-quarantine instructions reviewed with the pt.  Verbalized understanding.Chlorhexidine wipes along with instruction/verification sheet given to pt.  Instructed pt to date, time, and initial the verification sheet once skin prep has been  completed, and [facility].  Pt. Verbalizes understanding.

## 2022-01-26 ENCOUNTER — APPOINTMENT (OUTPATIENT)
Dept: GENERAL RADIOLOGY | Facility: HOSPITAL | Age: 27
End: 2022-01-26

## 2022-01-26 ENCOUNTER — ANESTHESIA (OUTPATIENT)
Dept: PERIOP | Facility: HOSPITAL | Age: 27
End: 2022-01-26

## 2022-01-26 ENCOUNTER — ANESTHESIA EVENT (OUTPATIENT)
Dept: PERIOP | Facility: HOSPITAL | Age: 27
End: 2022-01-26

## 2022-01-26 ENCOUNTER — HOSPITAL ENCOUNTER (OUTPATIENT)
Facility: HOSPITAL | Age: 27
Setting detail: HOSPITAL OUTPATIENT SURGERY
Discharge: HOME | End: 2022-01-26
Attending: SURGERY | Admitting: SURGERY

## 2022-01-26 VITALS
TEMPERATURE: 97.5 F | RESPIRATION RATE: 18 BRPM | HEART RATE: 75 BPM | DIASTOLIC BLOOD PRESSURE: 76 MMHG | OXYGEN SATURATION: 96 % | SYSTOLIC BLOOD PRESSURE: 109 MMHG

## 2022-01-26 DIAGNOSIS — R11.0 NAUSEA: ICD-10-CM

## 2022-01-26 DIAGNOSIS — E86.0 DEHYDRATION: ICD-10-CM

## 2022-01-26 DIAGNOSIS — R10.11 RIGHT UPPER QUADRANT ABDOMINAL PAIN: ICD-10-CM

## 2022-01-26 DIAGNOSIS — K82.8 BILIARY DYSKINESIA: ICD-10-CM

## 2022-01-26 PROCEDURE — 25010000002 SUCCINYLCHOLINE PER 20 MG: Performed by: NURSE ANESTHETIST, CERTIFIED REGISTERED

## 2022-01-26 PROCEDURE — 25010000002 KETOROLAC TROMETHAMINE PER 15 MG: Performed by: NURSE ANESTHETIST, CERTIFIED REGISTERED

## 2022-01-26 PROCEDURE — 25010000002 FENTANYL CITRATE (PF) 100 MCG/2ML SOLUTION: Performed by: NURSE ANESTHETIST, CERTIFIED REGISTERED

## 2022-01-26 PROCEDURE — C1889 IMPLANT/INSERT DEVICE, NOC: HCPCS | Performed by: SURGERY

## 2022-01-26 PROCEDURE — 25010000002 HYDROMORPHONE 1 MG/ML SOLUTION: Performed by: NURSE ANESTHETIST, CERTIFIED REGISTERED

## 2022-01-26 PROCEDURE — 94799 UNLISTED PULMONARY SVC/PX: CPT

## 2022-01-26 PROCEDURE — 25010000002 ONDANSETRON PER 1 MG: Performed by: NURSE ANESTHETIST, CERTIFIED REGISTERED

## 2022-01-26 PROCEDURE — 25010000002 PROPOFOL 200 MG/20ML EMULSION: Performed by: NURSE ANESTHETIST, CERTIFIED REGISTERED

## 2022-01-26 PROCEDURE — 47563 LAPARO CHOLECYSTECTOMY/GRAPH: CPT | Performed by: SURGERY

## 2022-01-26 PROCEDURE — 25010000002 IOPAMIDOL 61 % SOLUTION: Performed by: SURGERY

## 2022-01-26 PROCEDURE — 74300 X-RAY BILE DUCTS/PANCREAS: CPT

## 2022-01-26 PROCEDURE — 25010000002 DEXAMETHASONE PER 1 MG: Performed by: NURSE ANESTHETIST, CERTIFIED REGISTERED

## 2022-01-26 DEVICE — LIGAMAX 5 MM ENDOSCOPIC MULTIPLE CLIP APPLIER
Type: IMPLANTABLE DEVICE | Site: ABDOMEN | Status: FUNCTIONAL
Brand: LIGAMAX

## 2022-01-26 RX ORDER — ONDANSETRON 2 MG/ML
4 INJECTION INTRAMUSCULAR; INTRAVENOUS ONCE AS NEEDED
Status: DISCONTINUED | OUTPATIENT
Start: 2022-01-26 | End: 2022-01-26 | Stop reason: HOSPADM

## 2022-01-26 RX ORDER — ONDANSETRON 2 MG/ML
INJECTION INTRAMUSCULAR; INTRAVENOUS AS NEEDED
Status: DISCONTINUED | OUTPATIENT
Start: 2022-01-26 | End: 2022-01-26 | Stop reason: SURG

## 2022-01-26 RX ORDER — MEPERIDINE HYDROCHLORIDE 25 MG/ML
12.5 INJECTION INTRAMUSCULAR; INTRAVENOUS; SUBCUTANEOUS
Status: DISCONTINUED | OUTPATIENT
Start: 2022-01-26 | End: 2022-01-26 | Stop reason: HOSPADM

## 2022-01-26 RX ORDER — ROCURONIUM BROMIDE 10 MG/ML
INJECTION, SOLUTION INTRAVENOUS AS NEEDED
Status: DISCONTINUED | OUTPATIENT
Start: 2022-01-26 | End: 2022-01-26 | Stop reason: SURG

## 2022-01-26 RX ORDER — HYDROCODONE BITARTRATE AND ACETAMINOPHEN 7.5; 325 MG/1; MG/1
1 TABLET ORAL EVERY 6 HOURS PRN
Qty: 20 TABLET | Refills: 0 | Status: ON HOLD | OUTPATIENT
Start: 2022-01-26 | End: 2022-01-31

## 2022-01-26 RX ORDER — SCOLOPAMINE TRANSDERMAL SYSTEM 1 MG/1
1 PATCH, EXTENDED RELEASE TRANSDERMAL ONCE
Status: DISCONTINUED | OUTPATIENT
Start: 2022-01-26 | End: 2022-01-26 | Stop reason: HOSPADM

## 2022-01-26 RX ORDER — LORAZEPAM 2 MG/ML
1 INJECTION INTRAMUSCULAR
Status: DISCONTINUED | OUTPATIENT
Start: 2022-01-26 | End: 2022-01-26 | Stop reason: HOSPADM

## 2022-01-26 RX ORDER — FENTANYL CITRATE 50 UG/ML
INJECTION, SOLUTION INTRAMUSCULAR; INTRAVENOUS AS NEEDED
Status: DISCONTINUED | OUTPATIENT
Start: 2022-01-26 | End: 2022-01-26 | Stop reason: SURG

## 2022-01-26 RX ORDER — SODIUM CHLORIDE 9 MG/ML
100 INJECTION, SOLUTION INTRAVENOUS CONTINUOUS
Status: DISCONTINUED | OUTPATIENT
Start: 2022-01-26 | End: 2022-01-26 | Stop reason: HOSPADM

## 2022-01-26 RX ORDER — SUCCINYLCHOLINE CHLORIDE 20 MG/ML
INJECTION INTRAMUSCULAR; INTRAVENOUS AS NEEDED
Status: DISCONTINUED | OUTPATIENT
Start: 2022-01-26 | End: 2022-01-26 | Stop reason: SURG

## 2022-01-26 RX ORDER — ONDANSETRON 4 MG/1
4 TABLET, FILM COATED ORAL ONCE AS NEEDED
Status: DISCONTINUED | OUTPATIENT
Start: 2022-01-26 | End: 2022-01-26 | Stop reason: HOSPADM

## 2022-01-26 RX ORDER — BUPIVACAINE HYDROCHLORIDE 5 MG/ML
INJECTION, SOLUTION EPIDURAL; INTRACAUDAL AS NEEDED
Status: DISCONTINUED | OUTPATIENT
Start: 2022-01-26 | End: 2022-01-26 | Stop reason: HOSPADM

## 2022-01-26 RX ORDER — HYDROCODONE BITARTRATE AND ACETAMINOPHEN 7.5; 325 MG/1; MG/1
1 TABLET ORAL EVERY 6 HOURS PRN
Qty: 15 TABLET | Refills: 0 | Status: SHIPPED | OUTPATIENT
Start: 2022-01-26

## 2022-01-26 RX ORDER — LIDOCAINE HYDROCHLORIDE 20 MG/ML
INJECTION, SOLUTION INTRAVENOUS AS NEEDED
Status: DISCONTINUED | OUTPATIENT
Start: 2022-01-26 | End: 2022-01-26 | Stop reason: SURG

## 2022-01-26 RX ORDER — CLINDAMYCIN PHOSPHATE 900 MG/50ML
900 INJECTION, SOLUTION INTRAVENOUS ONCE
Status: CANCELLED | OUTPATIENT
Start: 2022-01-26

## 2022-01-26 RX ORDER — CLINDAMYCIN PHOSPHATE 900 MG/50ML
900 INJECTION, SOLUTION INTRAVENOUS ONCE
Status: COMPLETED | OUTPATIENT
Start: 2022-01-26 | End: 2022-01-26

## 2022-01-26 RX ORDER — PROPOFOL 10 MG/ML
INJECTION, EMULSION INTRAVENOUS AS NEEDED
Status: DISCONTINUED | OUTPATIENT
Start: 2022-01-26 | End: 2022-01-26 | Stop reason: SURG

## 2022-01-26 RX ORDER — IBUPROFEN 600 MG/1
600 TABLET ORAL EVERY 6 HOURS PRN
Status: DISCONTINUED | OUTPATIENT
Start: 2022-01-26 | End: 2022-01-26 | Stop reason: HOSPADM

## 2022-01-26 RX ORDER — DEXAMETHASONE SODIUM PHOSPHATE 4 MG/ML
INJECTION, SOLUTION INTRA-ARTICULAR; INTRALESIONAL; INTRAMUSCULAR; INTRAVENOUS; SOFT TISSUE AS NEEDED
Status: DISCONTINUED | OUTPATIENT
Start: 2022-01-26 | End: 2022-01-26 | Stop reason: SURG

## 2022-01-26 RX ORDER — KETOROLAC TROMETHAMINE 30 MG/ML
INJECTION, SOLUTION INTRAMUSCULAR; INTRAVENOUS AS NEEDED
Status: DISCONTINUED | OUTPATIENT
Start: 2022-01-26 | End: 2022-01-26 | Stop reason: SURG

## 2022-01-26 RX ADMIN — FENTANYL CITRATE 50 MCG: 50 INJECTION INTRAMUSCULAR; INTRAVENOUS at 10:55

## 2022-01-26 RX ADMIN — FENTANYL CITRATE 100 MCG: 50 INJECTION INTRAMUSCULAR; INTRAVENOUS at 10:14

## 2022-01-26 RX ADMIN — HYDROMORPHONE HYDROCHLORIDE 0.5 MG: 1 INJECTION, SOLUTION INTRAMUSCULAR; INTRAVENOUS; SUBCUTANEOUS at 11:29

## 2022-01-26 RX ADMIN — PROPOFOL 200 MG: 10 INJECTION, EMULSION INTRAVENOUS at 10:13

## 2022-01-26 RX ADMIN — SCOPALAMINE 1 PATCH: 1 PATCH, EXTENDED RELEASE TRANSDERMAL at 09:12

## 2022-01-26 RX ADMIN — CLINDAMYCIN PHOSPHATE 900 MG: 900 INJECTION, SOLUTION INTRAVENOUS at 10:14

## 2022-01-26 RX ADMIN — ONDANSETRON 4 MG: 2 INJECTION INTRAMUSCULAR; INTRAVENOUS at 10:14

## 2022-01-26 RX ADMIN — KETOROLAC TROMETHAMINE 30 MG: 30 INJECTION, SOLUTION INTRAMUSCULAR at 10:55

## 2022-01-26 RX ADMIN — ROCURONIUM BROMIDE 10 MG: 10 INJECTION INTRAVENOUS at 10:14

## 2022-01-26 RX ADMIN — HYDROMORPHONE HYDROCHLORIDE 0.5 MG: 1 INJECTION, SOLUTION INTRAMUSCULAR; INTRAVENOUS; SUBCUTANEOUS at 11:47

## 2022-01-26 RX ADMIN — SODIUM CHLORIDE, POTASSIUM CHLORIDE, SODIUM LACTATE AND CALCIUM CHLORIDE 1000 ML: 600; 310; 30; 20 INJECTION, SOLUTION INTRAVENOUS at 08:41

## 2022-01-26 RX ADMIN — SUCCINYLCHOLINE CHLORIDE 2 MG: 20 INJECTION, SOLUTION INTRAMUSCULAR; INTRAVENOUS at 10:14

## 2022-01-26 RX ADMIN — DEXAMETHASONE SODIUM PHOSPHATE 4 MG: 4 INJECTION, SOLUTION INTRAMUSCULAR; INTRAVENOUS at 10:14

## 2022-01-26 RX ADMIN — FENTANYL CITRATE 50 MCG: 50 INJECTION INTRAMUSCULAR; INTRAVENOUS at 10:43

## 2022-01-26 RX ADMIN — LIDOCAINE HYDROCHLORIDE 50 MG: 20 INJECTION, SOLUTION INTRAVENOUS at 10:14

## 2022-01-26 RX ADMIN — SODIUM CHLORIDE: 9 INJECTION, SOLUTION INTRAVENOUS at 10:14

## 2022-01-26 RX ADMIN — SODIUM CHLORIDE 100 ML/HR: 9 INJECTION, SOLUTION INTRAVENOUS at 07:40

## 2022-01-26 NOTE — OP NOTE
PATIENT:     Milton Taylor    DATE OF SURGERY:     1/26/2022    PHYSICIAN:   Nj De Jesus MD    REFERRING PHYSICIAN:  Lakeshia Yap MD    YOB: 1995    PREOPERATIVE DIAGNOSIS:  Chronic cholecystitis due to biliary dyskinesia    POSTOPERATIVE DIAGNOSIS:  Chronic cholecystitis due to biliary dyskinesia    PROCEDURE:  Laparoscopic cholecystectomy with intraoperative cholangiography.    ANESTHESIA:  General endotracheal.    HISTORY:  The patient was sent to me as a consultation via Lakeshia Yap MD for evaluation and treatment of chronic right upper quadrant and mid epigastric abdominal discomfort.  Workup was begun and the patient was subsequently found to have chronic cholecystitis due to biliary dyskinesia.  The patient is here now today for elective laparoscopic cholecystectomy with intraoperative cholangiography for treatment of chronic cholecystitis.  The procedure was discussed with the patient preoperatively who understands, agrees, and wishes to proceed with the above-mentioned procedure in an elective outpatient fashion.      OPERATIVE PROCEDURE:  The patient was taken to the operating room, placed in the supine position, and given general endotracheal anesthesia.  The patient was prepped and draped in the normal sterile fashion, and also received preoperative IV antibiotics.  An appropriate timeout was performed by the nursing staff prior to the incision.  I did discuss the situation with the patient preoperatively.      An umbilical incision was then made to insert a Veress needle to insufflate the abdomen with carbon dioxide, and a separate 5 mm port was inserted here along with a camera via an [employer].smview.  A separate subxiphoid port was inserted along with two right subcostal 5 mm ports.  The gallbladder was well visualized.    Good exposure was obtained, and the gallbladder was grasped at its infundibulum and its fundus and retracted superiorly and laterally.  There were  some chronic attachments of fatty tissue to the gallbladder indicative of chronic cholecystitis and these were easily taken down.  I should note that I did have to place a third right upper quadrant 5 mm trocar in order to insert a fan retractor to obtain better exposure.    Good exposure was obtained and dissection was performed in the triangle of Calot, and the cystic duct and cystic artery were identified in the normal manner.  A clip was then placed on the cystic duct proximally and then two clips were placed on the cystic artery proximally and one distally.  Cystic ductotomy was performed.  A separate cholangiogram catheter had been inserted through a right upper quadrant introducer port and then this was fed into the cystic duct itself and a clip was applied.     Intraoperative cholangiography was then performed under fluoroscopy, carefully evaluating the biliary ductal anatomy.  This was done without difficulty.  The right and left hepatic ducts were well visualized as well as the common hepatic duct.  The common bile duct was well visualized, as was the duodenum.  There was nice flow into the duodenum and there was no evidence of biliary ductal obstruction or choledocholithiasis.  There was ample distance between the cystic ductotomy and the cystic duct/common duct junction.  I did feel comfortable performing the procedure laparoscopically.    The cholangiogram catheter was removed.  The cystic duct was clipped twice distally and then divided, as was the cystic artery.  Bovie electrocautery was then used to remove the gallbladder from the liver bed.  It was then removed via the subxiphoid port site without difficulty.     Copious irrigation was used in the patient’s abdominal cavity.  It was clean and dry at this point.  Hemostasis was intact and there was no evidence of bilious leakage.  All trocar sites were injected with a local anesthetic mixture.  Trocars were removed under direct vision and the fascia  was closed with 0-Vicryl suture and 4-0 Vicryl subcuticular stitch along with Steri-Strips for skin reapproximation.      The patient was stable at this point and subsequently transferred back to the recovery room in stable condition.    Nj De Jesus MD

## 2022-01-28 ENCOUNTER — TELEPHONE (OUTPATIENT)
Dept: SURGERY | Facility: CLINIC | Age: 27
End: 2022-01-28

## 2022-01-28 ENCOUNTER — HOSPITAL ENCOUNTER (OUTPATIENT)
Facility: HOSPITAL | Age: 27
Discharge: HOME | End: 2022-02-02
Attending: EMERGENCY MEDICINE | Admitting: STUDENT IN AN ORGANIZED HEALTH CARE EDUCATION/TRAINING PROGRAM

## 2022-01-28 ENCOUNTER — APPOINTMENT (OUTPATIENT)
Dept: CT IMAGING | Facility: HOSPITAL | Age: 27
End: 2022-01-28

## 2022-01-28 DIAGNOSIS — E86.0 DEHYDRATION: ICD-10-CM

## 2022-01-28 DIAGNOSIS — R58 INTRAABDOMINAL HEMORRHAGE: Primary | ICD-10-CM

## 2022-01-28 DIAGNOSIS — H81.10: ICD-10-CM

## 2022-01-28 LAB
ALBUMIN SERPL-MCNC: 4 G/DL (ref 3.5–5.2)
ALBUMIN/GLOB SERPL: 1.4 G/DL
ALP SERPL-CCNC: 71 U/L (ref 39–117)
ALT SERPL W P-5'-P-CCNC: 24 U/L (ref 1–41)
ANION GAP SERPL CALCULATED.3IONS-SCNC: 16.8 MMOL/L (ref 5–15)
AST SERPL-CCNC: 29 U/L (ref 1–40)
BASOPHILS # BLD AUTO: 0.04 10*3/MM3 (ref 0–0.2)
BASOPHILS NFR BLD AUTO: 0.6 % (ref 0–1.5)
BILIRUB SERPL-MCNC: 0.9 MG/DL (ref 0–1.2)
BILIRUB UR QL STRIP: NEGATIVE
BUN SERPL-MCNC: 9 MG/DL (ref 6–20)
BUN/CREAT SERPL: 13 (ref 7–25)
CALCIUM SPEC-SCNC: 9.4 MG/DL (ref 8.6–10.5)
CHLORIDE SERPL-SCNC: 88 MMOL/L (ref 98–107)
CLARITY UR: CLEAR
CO2 SERPL-SCNC: 29.2 MMOL/L (ref 22–29)
COLOR UR: YELLOW
CREAT SERPL-MCNC: 0.69 MG/DL (ref 0.76–1.27)
DEPRECATED RDW RBC AUTO: 39.5 FL (ref 37–54)
EOSINOPHIL # BLD AUTO: 0.01 10*3/MM3 (ref 0–0.4)
EOSINOPHIL NFR BLD AUTO: 0.2 % (ref 0.3–6.2)
ERYTHROCYTE [DISTWIDTH] IN BLOOD BY AUTOMATED COUNT: 13.4 % (ref 12.3–15.4)
GFR SERPL CREATININE-BSD FRML MDRD: 139 ML/MIN/1.73
GLOBULIN UR ELPH-MCNC: 2.9 GM/DL
GLUCOSE SERPL-MCNC: 106 MG/DL (ref 65–99)
GLUCOSE UR STRIP-MCNC: NEGATIVE MG/DL
HCT VFR BLD AUTO: 36.6 % (ref 37.5–51)
HGB BLD-MCNC: 13.2 G/DL (ref 13–17.7)
HGB UR QL STRIP.AUTO: NEGATIVE
IMM GRANULOCYTES # BLD AUTO: 0.03 10*3/MM3 (ref 0–0.05)
IMM GRANULOCYTES NFR BLD AUTO: 0.5 % (ref 0–0.5)
KETONES UR QL STRIP: ABNORMAL
LEUKOCYTE ESTERASE UR QL STRIP.AUTO: NEGATIVE
LIPASE SERPL-CCNC: 165 U/L (ref 13–60)
LYMPHOCYTES # BLD AUTO: 1.25 10*3/MM3 (ref 0.7–3.1)
LYMPHOCYTES NFR BLD AUTO: 18.8 % (ref 19.6–45.3)
MAGNESIUM SERPL-MCNC: 2.1 MG/DL (ref 1.6–2.6)
MCH RBC QN AUTO: 29.6 PG (ref 26.6–33)
MCHC RBC AUTO-ENTMCNC: 36.1 G/DL (ref 31.5–35.7)
MCV RBC AUTO: 82.1 FL (ref 79–97)
MONOCYTES # BLD AUTO: 0.97 10*3/MM3 (ref 0.1–0.9)
MONOCYTES NFR BLD AUTO: 14.6 % (ref 5–12)
NEUTROPHILS NFR BLD AUTO: 4.35 10*3/MM3 (ref 1.7–7)
NEUTROPHILS NFR BLD AUTO: 65.3 % (ref 42.7–76)
NITRITE UR QL STRIP: NEGATIVE
NRBC BLD AUTO-RTO: 0 /100 WBC (ref 0–0.2)
PH UR STRIP.AUTO: 5.5 [PH] (ref 5–8)
PLATELET # BLD AUTO: 218 10*3/MM3 (ref 140–450)
PMV BLD AUTO: 10.9 FL (ref 6–12)
POTASSIUM SERPL-SCNC: 2.8 MMOL/L (ref 3.5–5.2)
PROT SERPL-MCNC: 6.9 G/DL (ref 6–8.5)
PROT UR QL STRIP: NEGATIVE
RBC # BLD AUTO: 4.46 10*6/MM3 (ref 4.14–5.8)
SODIUM SERPL-SCNC: 134 MMOL/L (ref 136–145)
SP GR UR STRIP: >1.03 (ref 1–1.03)
UROBILINOGEN UR QL STRIP: ABNORMAL
WBC NRBC COR # BLD: 6.65 10*3/MM3 (ref 3.4–10.8)

## 2022-01-28 PROCEDURE — 99220 PR INITIAL OBSERVATION CARE/DAY 70 MINUTES: CPT | Performed by: SURGERY

## 2022-01-28 PROCEDURE — 25010000002 ONDANSETRON PER 1 MG: Performed by: EMERGENCY MEDICINE

## 2022-01-28 PROCEDURE — 80053 COMPREHEN METABOLIC PANEL: CPT | Performed by: PHYSICIAN ASSISTANT

## 2022-01-28 PROCEDURE — 83735 ASSAY OF MAGNESIUM: CPT | Performed by: PHYSICIAN ASSISTANT

## 2022-01-28 PROCEDURE — 25010000002 IOPAMIDOL 61 % SOLUTION: Performed by: EMERGENCY MEDICINE

## 2022-01-28 PROCEDURE — 25010000002 ONDANSETRON PER 1 MG: Performed by: PHYSICIAN ASSISTANT

## 2022-01-28 PROCEDURE — 96375 TX/PRO/DX INJ NEW DRUG ADDON: CPT

## 2022-01-28 PROCEDURE — 96374 THER/PROPH/DIAG INJ IV PUSH: CPT

## 2022-01-28 PROCEDURE — 25010000002 MORPHINE PER 10 MG: Performed by: EMERGENCY MEDICINE

## 2022-01-28 PROCEDURE — 83690 ASSAY OF LIPASE: CPT | Performed by: PHYSICIAN ASSISTANT

## 2022-01-28 PROCEDURE — 99284 EMERGENCY DEPT VISIT MOD MDM: CPT

## 2022-01-28 PROCEDURE — 81003 URINALYSIS AUTO W/O SCOPE: CPT | Performed by: PHYSICIAN ASSISTANT

## 2022-01-28 PROCEDURE — 85025 COMPLETE CBC W/AUTO DIFF WBC: CPT | Performed by: PHYSICIAN ASSISTANT

## 2022-01-28 PROCEDURE — 96376 TX/PRO/DX INJ SAME DRUG ADON: CPT

## 2022-01-28 PROCEDURE — G0378 HOSPITAL OBSERVATION PER HR: HCPCS

## 2022-01-28 PROCEDURE — 74177 CT ABD & PELVIS W/CONTRAST: CPT

## 2022-01-28 RX ORDER — PROMETHAZINE HYDROCHLORIDE 12.5 MG/1
12.5 SUPPOSITORY RECTAL EVERY 6 HOURS PRN
Status: DISCONTINUED | OUTPATIENT
Start: 2022-01-28 | End: 2022-02-02 | Stop reason: HOSPADM

## 2022-01-28 RX ORDER — DEXTROSE AND SODIUM CHLORIDE 5; .45 G/100ML; G/100ML
100 INJECTION, SOLUTION INTRAVENOUS CONTINUOUS
Status: DISCONTINUED | OUTPATIENT
Start: 2022-01-28 | End: 2022-01-31

## 2022-01-28 RX ORDER — PROMETHAZINE HYDROCHLORIDE 12.5 MG/1
12.5 TABLET ORAL EVERY 6 HOURS PRN
Status: DISCONTINUED | OUTPATIENT
Start: 2022-01-28 | End: 2022-02-02 | Stop reason: HOSPADM

## 2022-01-28 RX ORDER — HYDROCODONE BITARTRATE AND ACETAMINOPHEN 7.5; 325 MG/1; MG/1
1 TABLET ORAL EVERY 6 HOURS PRN
Status: DISCONTINUED | OUTPATIENT
Start: 2022-01-28 | End: 2022-02-02 | Stop reason: HOSPADM

## 2022-01-28 RX ORDER — SODIUM CHLORIDE 0.9 % (FLUSH) 0.9 %
10 SYRINGE (ML) INJECTION AS NEEDED
Status: DISCONTINUED | OUTPATIENT
Start: 2022-01-28 | End: 2022-02-02 | Stop reason: HOSPADM

## 2022-01-28 RX ORDER — ONDANSETRON 2 MG/ML
4 INJECTION INTRAMUSCULAR; INTRAVENOUS ONCE
Status: COMPLETED | OUTPATIENT
Start: 2022-01-28 | End: 2022-01-28

## 2022-01-28 RX ORDER — FAMOTIDINE 10 MG/ML
20 INJECTION, SOLUTION INTRAVENOUS 2 TIMES DAILY
Status: DISCONTINUED | OUTPATIENT
Start: 2022-01-28 | End: 2022-02-02 | Stop reason: HOSPADM

## 2022-01-28 RX ORDER — POTASSIUM CHLORIDE 750 MG/1
40 CAPSULE, EXTENDED RELEASE ORAL ONCE
Status: COMPLETED | OUTPATIENT
Start: 2022-01-28 | End: 2022-01-28

## 2022-01-28 RX ORDER — NALOXONE HCL 0.4 MG/ML
0.1 VIAL (ML) INJECTION
Status: DISCONTINUED | OUTPATIENT
Start: 2022-01-28 | End: 2022-01-29 | Stop reason: SDUPTHER

## 2022-01-28 RX ORDER — SODIUM CHLORIDE 0.9 % (FLUSH) 0.9 %
3-10 SYRINGE (ML) INJECTION AS NEEDED
Status: DISCONTINUED | OUTPATIENT
Start: 2022-01-28 | End: 2022-02-01 | Stop reason: SDUPTHER

## 2022-01-28 RX ORDER — SODIUM CHLORIDE 0.9 % (FLUSH) 0.9 %
3 SYRINGE (ML) INJECTION EVERY 12 HOURS SCHEDULED
Status: DISCONTINUED | OUTPATIENT
Start: 2022-01-28 | End: 2022-02-01 | Stop reason: SDUPTHER

## 2022-01-28 RX ORDER — MORPHINE SULFATE 4 MG/ML
4 INJECTION, SOLUTION INTRAMUSCULAR; INTRAVENOUS ONCE
Status: COMPLETED | OUTPATIENT
Start: 2022-01-28 | End: 2022-01-28

## 2022-01-28 RX ORDER — HYDROCODONE BITARTRATE AND ACETAMINOPHEN 7.5; 325 MG/1; MG/1
1 TABLET ORAL EVERY 4 HOURS PRN
Status: DISCONTINUED | OUTPATIENT
Start: 2022-01-28 | End: 2022-01-29 | Stop reason: SDUPTHER

## 2022-01-28 RX ADMIN — ONDANSETRON 4 MG: 2 INJECTION INTRAMUSCULAR; INTRAVENOUS at 18:29

## 2022-01-28 RX ADMIN — POTASSIUM CHLORIDE 40 MEQ: 10 CAPSULE, COATED, EXTENDED RELEASE ORAL at 18:50

## 2022-01-28 RX ADMIN — IOPAMIDOL 100 ML: 612 INJECTION, SOLUTION INTRAVENOUS at 17:31

## 2022-01-28 RX ADMIN — MORPHINE SULFATE 4 MG: 4 INJECTION, SOLUTION INTRAMUSCULAR; INTRAVENOUS at 18:29

## 2022-01-28 RX ADMIN — FAMOTIDINE 20 MG: 10 INJECTION INTRAVENOUS at 22:07

## 2022-01-28 RX ADMIN — SODIUM CHLORIDE 1000 ML: 9 INJECTION, SOLUTION INTRAVENOUS at 16:13

## 2022-01-28 RX ADMIN — SODIUM CHLORIDE, PRESERVATIVE FREE 3 ML: 5 INJECTION INTRAVENOUS at 22:08

## 2022-01-28 RX ADMIN — ONDANSETRON 4 MG: 2 INJECTION INTRAMUSCULAR; INTRAVENOUS at 20:02

## 2022-01-28 RX ADMIN — DEXTROSE AND SODIUM CHLORIDE 100 ML/HR: 5; 450 INJECTION, SOLUTION INTRAVENOUS at 22:08

## 2022-01-28 NOTE — TELEPHONE ENCOUNTER
Per Dr. Christopher, pt will go to [facility] emergency department for evaluation.

## 2022-01-28 NOTE — TELEPHONE ENCOUNTER
"Per Dr. Christopher, I asked pt to go to [facility] emergency department for eval, he stated \"I will go.\"  "

## 2022-01-28 NOTE — TELEPHONE ENCOUNTER
"Pt's mother called, she stated \"Milton is having nausea and vomiting today.\"  I talked with her about possibility of pain medicine causing nausea and vomiting, she stated \"he has taken it before and did not have problems, he has some Zofran and I will have him take some and see if it helps.\"  I told her to let me know in a couple of hours if he is feeling better and I reminded her to ask him to stay on ice chips and sips of liquids only at this time.  "

## 2022-01-29 ENCOUNTER — ANESTHESIA EVENT (OUTPATIENT)
Dept: PERIOP | Facility: HOSPITAL | Age: 27
End: 2022-01-29

## 2022-01-29 ENCOUNTER — ANESTHESIA (OUTPATIENT)
Dept: PERIOP | Facility: HOSPITAL | Age: 27
End: 2022-01-29

## 2022-01-29 LAB
ALBUMIN SERPL-MCNC: 3.5 G/DL (ref 3.5–5.2)
ALBUMIN/GLOB SERPL: 1.3 G/DL
ALP SERPL-CCNC: 70 U/L (ref 39–117)
ALT SERPL W P-5'-P-CCNC: 23 U/L (ref 1–41)
ANION GAP SERPL CALCULATED.3IONS-SCNC: 13.3 MMOL/L (ref 5–15)
AST SERPL-CCNC: 26 U/L (ref 1–40)
BASOPHILS # BLD AUTO: 0.05 10*3/MM3 (ref 0–0.2)
BASOPHILS NFR BLD AUTO: 0.9 % (ref 0–1.5)
BILIRUB SERPL-MCNC: 0.7 MG/DL (ref 0–1.2)
BUN SERPL-MCNC: 7 MG/DL (ref 6–20)
BUN/CREAT SERPL: 10.1 (ref 7–25)
CALCIUM SPEC-SCNC: 8.6 MG/DL (ref 8.6–10.5)
CHLORIDE SERPL-SCNC: 90 MMOL/L (ref 98–107)
CO2 SERPL-SCNC: 31.7 MMOL/L (ref 22–29)
CREAT SERPL-MCNC: 0.69 MG/DL (ref 0.76–1.27)
DEPRECATED RDW RBC AUTO: 41.1 FL (ref 37–54)
EOSINOPHIL # BLD AUTO: 0.01 10*3/MM3 (ref 0–0.4)
EOSINOPHIL NFR BLD AUTO: 0.2 % (ref 0.3–6.2)
ERYTHROCYTE [DISTWIDTH] IN BLOOD BY AUTOMATED COUNT: 13.5 % (ref 12.3–15.4)
GFR SERPL CREATININE-BSD FRML MDRD: 139 ML/MIN/1.73
GLOBULIN UR ELPH-MCNC: 2.6 GM/DL
GLUCOSE SERPL-MCNC: 109 MG/DL (ref 65–99)
HCT VFR BLD AUTO: 32.7 % (ref 37.5–51)
HGB BLD-MCNC: 11.5 G/DL (ref 13–17.7)
IMM GRANULOCYTES # BLD AUTO: 0.02 10*3/MM3 (ref 0–0.05)
IMM GRANULOCYTES NFR BLD AUTO: 0.4 % (ref 0–0.5)
LAB AP CASE REPORT: NORMAL
LYMPHOCYTES # BLD AUTO: 1.05 10*3/MM3 (ref 0.7–3.1)
LYMPHOCYTES NFR BLD AUTO: 19.9 % (ref 19.6–45.3)
MCH RBC QN AUTO: 29.3 PG (ref 26.6–33)
MCHC RBC AUTO-ENTMCNC: 35.2 G/DL (ref 31.5–35.7)
MCV RBC AUTO: 83.4 FL (ref 79–97)
MONOCYTES # BLD AUTO: 0.6 10*3/MM3 (ref 0.1–0.9)
MONOCYTES NFR BLD AUTO: 11.4 % (ref 5–12)
NEUTROPHILS NFR BLD AUTO: 3.55 10*3/MM3 (ref 1.7–7)
NEUTROPHILS NFR BLD AUTO: 67.2 % (ref 42.7–76)
NRBC BLD AUTO-RTO: 0 /100 WBC (ref 0–0.2)
PATH REPORT.FINAL DX SPEC: NORMAL
PLATELET # BLD AUTO: 169 10*3/MM3 (ref 140–450)
PMV BLD AUTO: 11.1 FL (ref 6–12)
POTASSIUM SERPL-SCNC: 2.7 MMOL/L (ref 3.5–5.2)
PROT SERPL-MCNC: 6.1 G/DL (ref 6–8.5)
RBC # BLD AUTO: 3.92 10*6/MM3 (ref 4.14–5.8)
SARS-COV-2 RNA PNL SPEC NAA+PROBE: NOT DETECTED
SODIUM SERPL-SCNC: 135 MMOL/L (ref 136–145)
WBC NRBC COR # BLD: 5.28 10*3/MM3 (ref 3.4–10.8)

## 2022-01-29 PROCEDURE — 85025 COMPLETE CBC W/AUTO DIFF WBC: CPT | Performed by: SURGERY

## 2022-01-29 PROCEDURE — 25010000002 ONDANSETRON PER 1 MG: Performed by: NURSE ANESTHETIST, CERTIFIED REGISTERED

## 2022-01-29 PROCEDURE — 25010000002 SUCCINYLCHOLINE PER 20 MG: Performed by: NURSE ANESTHETIST, CERTIFIED REGISTERED

## 2022-01-29 PROCEDURE — C1889 IMPLANT/INSERT DEVICE, NOC: HCPCS | Performed by: SURGERY

## 2022-01-29 PROCEDURE — 25010000002 DEXAMETHASONE PER 1 MG: Performed by: NURSE ANESTHETIST, CERTIFIED REGISTERED

## 2022-01-29 PROCEDURE — G0378 HOSPITAL OBSERVATION PER HR: HCPCS

## 2022-01-29 PROCEDURE — 25010000002 MIDAZOLAM PER 1MG: Performed by: NURSE ANESTHETIST, CERTIFIED REGISTERED

## 2022-01-29 PROCEDURE — 25010000002 PROPOFOL 200 MG/20ML EMULSION: Performed by: NURSE ANESTHETIST, CERTIFIED REGISTERED

## 2022-01-29 PROCEDURE — 94799 UNLISTED PULMONARY SVC/PX: CPT

## 2022-01-29 PROCEDURE — 25010000002 FENTANYL CITRATE (PF) 100 MCG/2ML SOLUTION: Performed by: NURSE ANESTHETIST, CERTIFIED REGISTERED

## 2022-01-29 PROCEDURE — 87635 SARS-COV-2 COVID-19 AMP PRB: CPT | Performed by: SURGERY

## 2022-01-29 PROCEDURE — 96376 TX/PRO/DX INJ SAME DRUG ADON: CPT

## 2022-01-29 PROCEDURE — 49322 LAPAROSCOPY ASPIRATION: CPT | Performed by: SURGERY

## 2022-01-29 PROCEDURE — 99213 OFFICE O/P EST LOW 20 MIN: CPT | Performed by: SURGERY

## 2022-01-29 PROCEDURE — 80053 COMPREHEN METABOLIC PANEL: CPT | Performed by: SURGERY

## 2022-01-29 DEVICE — FLOSEAL HEMOSTATIC MATRIX, 10ML
Type: IMPLANTABLE DEVICE | Site: ABDOMEN | Status: FUNCTIONAL
Brand: FLOSEAL HEMOSTATIC MATRIX

## 2022-01-29 RX ORDER — FENTANYL CITRATE 50 UG/ML
INJECTION, SOLUTION INTRAMUSCULAR; INTRAVENOUS AS NEEDED
Status: DISCONTINUED | OUTPATIENT
Start: 2022-01-29 | End: 2022-01-29 | Stop reason: SURG

## 2022-01-29 RX ORDER — ONDANSETRON 4 MG/1
4 TABLET, FILM COATED ORAL EVERY 6 HOURS PRN
Status: DISCONTINUED | OUTPATIENT
Start: 2022-01-29 | End: 2022-02-02 | Stop reason: HOSPADM

## 2022-01-29 RX ORDER — PROPOFOL 10 MG/ML
INJECTION, EMULSION INTRAVENOUS AS NEEDED
Status: DISCONTINUED | OUTPATIENT
Start: 2022-01-29 | End: 2022-01-29 | Stop reason: SURG

## 2022-01-29 RX ORDER — SODIUM CHLORIDE 0.9 % (FLUSH) 0.9 %
10 SYRINGE (ML) INJECTION EVERY 12 HOURS SCHEDULED
Status: DISCONTINUED | OUTPATIENT
Start: 2022-01-29 | End: 2022-01-29 | Stop reason: HOSPADM

## 2022-01-29 RX ORDER — ONDANSETRON 2 MG/ML
4 INJECTION INTRAMUSCULAR; INTRAVENOUS ONCE AS NEEDED
Status: DISCONTINUED | OUTPATIENT
Start: 2022-01-29 | End: 2022-01-29 | Stop reason: HOSPADM

## 2022-01-29 RX ORDER — ONDANSETRON 2 MG/ML
INJECTION INTRAMUSCULAR; INTRAVENOUS AS NEEDED
Status: DISCONTINUED | OUTPATIENT
Start: 2022-01-29 | End: 2022-01-29 | Stop reason: SURG

## 2022-01-29 RX ORDER — DEXTROSE AND SODIUM CHLORIDE 5; .45 G/100ML; G/100ML
100 INJECTION, SOLUTION INTRAVENOUS CONTINUOUS
Status: DISCONTINUED | OUTPATIENT
Start: 2022-01-29 | End: 2022-01-29 | Stop reason: SDUPTHER

## 2022-01-29 RX ORDER — MEPERIDINE HYDROCHLORIDE 25 MG/ML
25 INJECTION INTRAMUSCULAR; INTRAVENOUS; SUBCUTANEOUS ONCE
Status: DISCONTINUED | OUTPATIENT
Start: 2022-01-29 | End: 2022-01-29 | Stop reason: HOSPADM

## 2022-01-29 RX ORDER — SODIUM CHLORIDE 0.9 % (FLUSH) 0.9 %
10 SYRINGE (ML) INJECTION AS NEEDED
Status: DISCONTINUED | OUTPATIENT
Start: 2022-01-29 | End: 2022-01-29 | Stop reason: HOSPADM

## 2022-01-29 RX ORDER — MIDAZOLAM HYDROCHLORIDE 2 MG/2ML
INJECTION, SOLUTION INTRAMUSCULAR; INTRAVENOUS AS NEEDED
Status: DISCONTINUED | OUTPATIENT
Start: 2022-01-29 | End: 2022-01-29 | Stop reason: SURG

## 2022-01-29 RX ORDER — FAMOTIDINE 10 MG/ML
20 INJECTION, SOLUTION INTRAVENOUS 2 TIMES DAILY
Status: DISCONTINUED | OUTPATIENT
Start: 2022-01-29 | End: 2022-01-29 | Stop reason: SDUPTHER

## 2022-01-29 RX ORDER — HYDROCODONE BITARTRATE AND ACETAMINOPHEN 7.5; 325 MG/1; MG/1
1 TABLET ORAL EVERY 4 HOURS PRN
Status: DISCONTINUED | OUTPATIENT
Start: 2022-01-29 | End: 2022-02-02 | Stop reason: HOSPADM

## 2022-01-29 RX ORDER — DEXAMETHASONE SODIUM PHOSPHATE 4 MG/ML
INJECTION, SOLUTION INTRA-ARTICULAR; INTRALESIONAL; INTRAMUSCULAR; INTRAVENOUS; SOFT TISSUE AS NEEDED
Status: DISCONTINUED | OUTPATIENT
Start: 2022-01-29 | End: 2022-01-29 | Stop reason: SURG

## 2022-01-29 RX ORDER — LIDOCAINE HYDROCHLORIDE 20 MG/ML
INJECTION, SOLUTION INTRAVENOUS AS NEEDED
Status: DISCONTINUED | OUTPATIENT
Start: 2022-01-29 | End: 2022-01-29 | Stop reason: SURG

## 2022-01-29 RX ORDER — BUPIVACAINE HYDROCHLORIDE 5 MG/ML
INJECTION, SOLUTION EPIDURAL; INTRACAUDAL AS NEEDED
Status: DISCONTINUED | OUTPATIENT
Start: 2022-01-29 | End: 2022-01-29 | Stop reason: HOSPADM

## 2022-01-29 RX ORDER — SUCCINYLCHOLINE CHLORIDE 20 MG/ML
INJECTION INTRAMUSCULAR; INTRAVENOUS AS NEEDED
Status: DISCONTINUED | OUTPATIENT
Start: 2022-01-29 | End: 2022-01-29 | Stop reason: SURG

## 2022-01-29 RX ORDER — ONDANSETRON 2 MG/ML
4 INJECTION INTRAMUSCULAR; INTRAVENOUS EVERY 6 HOURS PRN
Status: DISCONTINUED | OUTPATIENT
Start: 2022-01-29 | End: 2022-02-02 | Stop reason: HOSPADM

## 2022-01-29 RX ORDER — ROCURONIUM BROMIDE 10 MG/ML
INJECTION, SOLUTION INTRAVENOUS AS NEEDED
Status: DISCONTINUED | OUTPATIENT
Start: 2022-01-29 | End: 2022-01-29 | Stop reason: SURG

## 2022-01-29 RX ORDER — SODIUM CHLORIDE, SODIUM LACTATE, POTASSIUM CHLORIDE, CALCIUM CHLORIDE 600; 310; 30; 20 MG/100ML; MG/100ML; MG/100ML; MG/100ML
1000 INJECTION, SOLUTION INTRAVENOUS CONTINUOUS
Status: DISCONTINUED | OUTPATIENT
Start: 2022-01-29 | End: 2022-01-30

## 2022-01-29 RX ORDER — CLINDAMYCIN PHOSPHATE 900 MG/50ML
900 INJECTION, SOLUTION INTRAVENOUS ONCE
Status: DISCONTINUED | OUTPATIENT
Start: 2022-01-29 | End: 2022-01-29 | Stop reason: HOSPADM

## 2022-01-29 RX ORDER — NEOSTIGMINE METHYLSULFATE 5 MG/5 ML
SYRINGE (ML) INTRAVENOUS AS NEEDED
Status: DISCONTINUED | OUTPATIENT
Start: 2022-01-29 | End: 2022-01-29 | Stop reason: SURG

## 2022-01-29 RX ORDER — SODIUM CHLORIDE 0.9 % (FLUSH) 0.9 %
3-10 SYRINGE (ML) INJECTION AS NEEDED
Status: DISCONTINUED | OUTPATIENT
Start: 2022-01-29 | End: 2022-02-02 | Stop reason: HOSPADM

## 2022-01-29 RX ORDER — SODIUM CHLORIDE 0.9 % (FLUSH) 0.9 %
3 SYRINGE (ML) INJECTION EVERY 12 HOURS SCHEDULED
Status: DISCONTINUED | OUTPATIENT
Start: 2022-01-29 | End: 2022-02-02 | Stop reason: HOSPADM

## 2022-01-29 RX ORDER — NALOXONE HCL 0.4 MG/ML
0.1 VIAL (ML) INJECTION
Status: DISCONTINUED | OUTPATIENT
Start: 2022-01-29 | End: 2022-01-31

## 2022-01-29 RX ADMIN — SODIUM CHLORIDE, POTASSIUM CHLORIDE, SODIUM LACTATE AND CALCIUM CHLORIDE 1000 ML: 600; 310; 30; 20 INJECTION, SOLUTION INTRAVENOUS at 14:29

## 2022-01-29 RX ADMIN — DEXAMETHASONE SODIUM PHOSPHATE 8 MG: 4 INJECTION, SOLUTION INTRAMUSCULAR; INTRAVENOUS at 15:03

## 2022-01-29 RX ADMIN — SODIUM CHLORIDE, PRESERVATIVE FREE 10 ML: 5 INJECTION INTRAVENOUS at 20:00

## 2022-01-29 RX ADMIN — MIDAZOLAM HYDROCHLORIDE 2 MG: 1 INJECTION, SOLUTION INTRAMUSCULAR; INTRAVENOUS at 14:54

## 2022-01-29 RX ADMIN — GLYCOPYRROLATE 0.2 MG: 0.2 INJECTION, SOLUTION INTRAMUSCULAR; INTRAVENOUS at 15:32

## 2022-01-29 RX ADMIN — ROCURONIUM BROMIDE 10 MG: 10 INJECTION INTRAVENOUS at 15:01

## 2022-01-29 RX ADMIN — ONDANSETRON 4 MG: 2 INJECTION INTRAMUSCULAR; INTRAVENOUS at 15:03

## 2022-01-29 RX ADMIN — FENTANYL CITRATE 100 MCG: 50 INJECTION INTRAMUSCULAR; INTRAVENOUS at 14:54

## 2022-01-29 RX ADMIN — SODIUM CHLORIDE, PRESERVATIVE FREE 3 ML: 5 INJECTION INTRAVENOUS at 08:04

## 2022-01-29 RX ADMIN — FAMOTIDINE 20 MG: 10 INJECTION INTRAVENOUS at 20:00

## 2022-01-29 RX ADMIN — Medication 4 MG: at 15:32

## 2022-01-29 RX ADMIN — SODIUM CHLORIDE, POTASSIUM CHLORIDE, SODIUM LACTATE AND CALCIUM CHLORIDE: 600; 310; 30; 20 INJECTION, SOLUTION INTRAVENOUS at 15:29

## 2022-01-29 RX ADMIN — LIDOCAINE HYDROCHLORIDE 40 MG: 20 INJECTION, SOLUTION INTRAVENOUS at 15:01

## 2022-01-29 RX ADMIN — PROPOFOL 200 MG: 10 INJECTION, EMULSION INTRAVENOUS at 15:01

## 2022-01-29 RX ADMIN — FAMOTIDINE 20 MG: 10 INJECTION INTRAVENOUS at 08:04

## 2022-01-29 RX ADMIN — SUCCINYLCHOLINE CHLORIDE 160 MG: 20 INJECTION, SOLUTION INTRAMUSCULAR; INTRAVENOUS at 15:01

## 2022-01-29 RX ADMIN — Medication 10 ML: at 12:08

## 2022-01-29 RX ADMIN — ROCURONIUM BROMIDE 10 MG: 10 INJECTION INTRAVENOUS at 15:12

## 2022-01-29 NOTE — ANESTHESIA POSTPROCEDURE EVALUATION
Patient: Milton Taylor    Procedure Summary     Date: 01/29/22 Room / Location: [facility] OR 4 /  [facility] OR    Anesthesia Start: 1454 Anesthesia Stop:     Procedure: DIAGNOSTIC LAPAROSCOPY evacuation of hematoma and control bleeding of liver bed (N/A Abdomen) Diagnosis:       Intraabdominal hemorrhage      (Intraabdominal hemorrhage [R58])    Surgeons: Sonya Christopher MD Provider: Fox Palacio CRNA    Anesthesia Type: general ASA Status: 3 - Emergent          Anesthesia Type: general    Vitals  HR 82  Sat 99  Resp 20  /85  Temp 99.1      Post Anesthesia Care and Evaluation    Patient location during evaluation: PACU  Patient participation: complete - patient participated  Level of consciousness: awake and alert and sleepy but conscious  Pain management: satisfactory to patient  Airway patency: patent  Anesthetic complications: No anesthetic complications    Cardiovascular status: acceptable and stable  Respiratory status: acceptable and face mask  Hydration status: acceptable

## 2022-01-29 NOTE — ANESTHESIA PREPROCEDURE EVALUATION
Anesthesia Evaluation     Patient summary reviewed and Nursing notes reviewed   history of anesthetic complications: PONV  NPO Solid Status: > 8 hours  NPO Liquid Status: > 8 hours           Airway   Mallampati: II  TM distance: >3 FB  Neck ROM: full  Possible difficult intubation, Difficult intubation highly probable and Large neck circumference  Dental      Pulmonary    (+) a smoker Former, shortness of breath, sleep apnea, decreased breath sounds,   Cardiovascular   Exercise tolerance: poor (<4 METS)    ECG reviewed    (+) angina, GRAF,   Past MI:  ? mi per ekg. CAD:  inc risk obese, yris       Neuro/Psych  (+) psychiatric history Anxiety and Depression,     GI/Hepatic/Renal/Endo    (+) obesity, morbid obesity, GERD,      Musculoskeletal     Abdominal    Substance History      OB/GYN          Other        ROS/Med Hx Other: Postop GB bleed                    Anesthesia Plan    ASA 3 - emergent     general   (Risks and benefits discussed including risk of aspiration, recall and dental damage. All patient questions answered.    Will continue with plan of care.)  intravenous induction     Anesthetic plan, all risks, benefits, and alternatives have been provided, discussed and informed consent has been obtained with: patient.        CODE STATUS:

## 2022-01-29 NOTE — ANESTHESIA PROCEDURE NOTES
Airway  Urgency: elective    Date/Time: 1/29/2022 3:02 PM  Airway not difficult    General Information and Staff    Patient location during procedure: OR  CRNA: Fox Palacio CRNA    Indications and Patient Condition  Indications for airway management: airway protection    Preoxygenated: yes  Mask difficulty assessment: 0 - not attempted    Final Airway Details  Final airway type: endotracheal airway      Successful airway: ETT  Cuffed: yes   Successful intubation technique: direct laryngoscopy and RSI  Endotracheal tube insertion site: oral  Blade: Weiner  Blade size: 2  ETT size (mm): 7.0  Cormack-Lehane Classification: grade I - full view of glottis  Placement verified by: chest auscultation and capnometry   Measured from: lips  ETT/EBT  to lips (cm): 21  Number of attempts at approach: 1    Additional Comments  Dentition and Lips as preoperative assesment

## 2022-01-30 LAB
BASOPHILS # BLD AUTO: 0.01 10*3/MM3 (ref 0–0.2)
BASOPHILS NFR BLD AUTO: 0.2 % (ref 0–1.5)
DEPRECATED RDW RBC AUTO: 40.4 FL (ref 37–54)
EOSINOPHIL # BLD AUTO: 0 10*3/MM3 (ref 0–0.4)
EOSINOPHIL NFR BLD AUTO: 0 % (ref 0.3–6.2)
ERYTHROCYTE [DISTWIDTH] IN BLOOD BY AUTOMATED COUNT: 13.3 % (ref 12.3–15.4)
HCT VFR BLD AUTO: 31.5 % (ref 37.5–51)
HGB BLD-MCNC: 10.9 G/DL (ref 13–17.7)
IMM GRANULOCYTES # BLD AUTO: 0.03 10*3/MM3 (ref 0–0.05)
IMM GRANULOCYTES NFR BLD AUTO: 0.6 % (ref 0–0.5)
LYMPHOCYTES # BLD AUTO: 0.34 10*3/MM3 (ref 0.7–3.1)
LYMPHOCYTES NFR BLD AUTO: 6.8 % (ref 19.6–45.3)
MCH RBC QN AUTO: 28.9 PG (ref 26.6–33)
MCHC RBC AUTO-ENTMCNC: 34.6 G/DL (ref 31.5–35.7)
MCV RBC AUTO: 83.6 FL (ref 79–97)
MONOCYTES # BLD AUTO: 0.49 10*3/MM3 (ref 0.1–0.9)
MONOCYTES NFR BLD AUTO: 9.8 % (ref 5–12)
NEUTROPHILS NFR BLD AUTO: 4.12 10*3/MM3 (ref 1.7–7)
NEUTROPHILS NFR BLD AUTO: 82.6 % (ref 42.7–76)
NRBC BLD AUTO-RTO: 0 /100 WBC (ref 0–0.2)
PLATELET # BLD AUTO: 178 10*3/MM3 (ref 140–450)
PMV BLD AUTO: 11.2 FL (ref 6–12)
RBC # BLD AUTO: 3.77 10*6/MM3 (ref 4.14–5.8)
WBC NRBC COR # BLD: 4.99 10*3/MM3 (ref 3.4–10.8)

## 2022-01-30 PROCEDURE — 99024 POSTOP FOLLOW-UP VISIT: CPT | Performed by: SURGERY

## 2022-01-30 PROCEDURE — 94799 UNLISTED PULMONARY SVC/PX: CPT

## 2022-01-30 PROCEDURE — 85025 COMPLETE CBC W/AUTO DIFF WBC: CPT | Performed by: SURGERY

## 2022-01-30 PROCEDURE — 63710000001 PROMETHAZINE PER 12.5 MG: Performed by: SURGERY

## 2022-01-30 PROCEDURE — 25010000002 ONDANSETRON PER 1 MG: Performed by: SURGERY

## 2022-01-30 PROCEDURE — G0378 HOSPITAL OBSERVATION PER HR: HCPCS

## 2022-01-30 PROCEDURE — 63710000001 ONDANSETRON PER 8 MG: Performed by: SURGERY

## 2022-01-30 RX ADMIN — SODIUM CHLORIDE, PRESERVATIVE FREE 3 ML: 5 INJECTION INTRAVENOUS at 20:05

## 2022-01-30 RX ADMIN — PROMETHAZINE HYDROCHLORIDE 12.5 MG: 12.5 TABLET ORAL at 21:21

## 2022-01-30 RX ADMIN — HYDROCODONE BITARTRATE AND ACETAMINOPHEN 1 TABLET: 7.5; 325 TABLET ORAL at 03:49

## 2022-01-30 RX ADMIN — HYDROCODONE BITARTRATE AND ACETAMINOPHEN 1 TABLET: 7.5; 325 TABLET ORAL at 20:04

## 2022-01-30 RX ADMIN — FAMOTIDINE 20 MG: 10 INJECTION INTRAVENOUS at 08:35

## 2022-01-30 RX ADMIN — SODIUM CHLORIDE, PRESERVATIVE FREE 3 ML: 5 INJECTION INTRAVENOUS at 10:37

## 2022-01-30 RX ADMIN — ONDANSETRON HYDROCHLORIDE 4 MG: 4 TABLET, FILM COATED ORAL at 12:02

## 2022-01-30 RX ADMIN — ONDANSETRON 4 MG: 2 INJECTION INTRAMUSCULAR; INTRAVENOUS at 18:23

## 2022-01-30 RX ADMIN — SODIUM CHLORIDE, POTASSIUM CHLORIDE, SODIUM LACTATE AND CALCIUM CHLORIDE 1000 ML: 600; 310; 30; 20 INJECTION, SOLUTION INTRAVENOUS at 01:14

## 2022-01-30 RX ADMIN — SODIUM CHLORIDE, PRESERVATIVE FREE 3 ML: 5 INJECTION INTRAVENOUS at 08:36

## 2022-01-30 RX ADMIN — DEXTROSE AND SODIUM CHLORIDE 100 ML/HR: 5; 450 INJECTION, SOLUTION INTRAVENOUS at 20:00

## 2022-01-30 RX ADMIN — FAMOTIDINE 20 MG: 10 INJECTION INTRAVENOUS at 20:03

## 2022-01-31 ENCOUNTER — APPOINTMENT (OUTPATIENT)
Dept: CT IMAGING | Facility: HOSPITAL | Age: 27
End: 2022-01-31

## 2022-01-31 PROBLEM — H81.10: Status: ACTIVE | Noted: 2022-01-31

## 2022-01-31 LAB
ALBUMIN SERPL-MCNC: 3.7 G/DL (ref 3.5–5.2)
ALBUMIN/GLOB SERPL: 1.8 G/DL
ALP SERPL-CCNC: 108 U/L (ref 39–117)
ALT SERPL W P-5'-P-CCNC: 55 U/L (ref 1–41)
AMPHET+METHAMPHET UR QL: NEGATIVE
AMPHETAMINES UR QL: NEGATIVE
ANION GAP SERPL CALCULATED.3IONS-SCNC: 10.2 MMOL/L (ref 5–15)
AST SERPL-CCNC: 70 U/L (ref 1–40)
BARBITURATES UR QL SCN: NEGATIVE
BENZODIAZ UR QL SCN: NEGATIVE
BILIRUB SERPL-MCNC: 1.2 MG/DL (ref 0–1.2)
BUN SERPL-MCNC: 4 MG/DL (ref 6–20)
BUN/CREAT SERPL: 7.4 (ref 7–25)
BUPRENORPHINE SERPL-MCNC: NEGATIVE NG/ML
CALCIUM SPEC-SCNC: 8.8 MG/DL (ref 8.6–10.5)
CANNABINOIDS SERPL QL: NEGATIVE
CHLORIDE SERPL-SCNC: 92 MMOL/L (ref 98–107)
CO2 SERPL-SCNC: 34.8 MMOL/L (ref 22–29)
COCAINE UR QL: NEGATIVE
CREAT SERPL-MCNC: 0.54 MG/DL (ref 0.76–1.27)
GFR SERPL CREATININE-BSD FRML MDRD: >150 ML/MIN/1.73
GLOBULIN UR ELPH-MCNC: 2.1 GM/DL
GLUCOSE SERPL-MCNC: 107 MG/DL (ref 65–99)
HCT VFR BLD AUTO: 34.1 % (ref 37.5–51)
HGB BLD-MCNC: 11.7 G/DL (ref 13–17.7)
LIPASE SERPL-CCNC: 154 U/L (ref 13–60)
MAGNESIUM SERPL-MCNC: 2 MG/DL (ref 1.6–2.6)
METHADONE UR QL SCN: NEGATIVE
OPIATES UR QL: POSITIVE
OXYCODONE UR QL SCN: NEGATIVE
PCP UR QL SCN: NEGATIVE
POTASSIUM SERPL-SCNC: 2.9 MMOL/L (ref 3.5–5.2)
PROPOXYPH UR QL: NEGATIVE
PROT SERPL-MCNC: 5.8 G/DL (ref 6–8.5)
SODIUM SERPL-SCNC: 137 MMOL/L (ref 136–145)
TRICYCLICS UR QL SCN: NEGATIVE

## 2022-01-31 PROCEDURE — 25010000002 HYDROMORPHONE 1 MG/ML SOLUTION: Performed by: SURGERY

## 2022-01-31 PROCEDURE — 99024 POSTOP FOLLOW-UP VISIT: CPT | Performed by: SURGERY

## 2022-01-31 PROCEDURE — 94799 UNLISTED PULMONARY SVC/PX: CPT

## 2022-01-31 PROCEDURE — 83735 ASSAY OF MAGNESIUM: CPT | Performed by: FAMILY MEDICINE

## 2022-01-31 PROCEDURE — 85014 HEMATOCRIT: CPT | Performed by: SURGERY

## 2022-01-31 PROCEDURE — 0 POTASSIUM CHLORIDE 10 MEQ/100ML SOLUTION: Performed by: SURGERY

## 2022-01-31 PROCEDURE — 99218 PR INITIAL OBSERVATION CARE/DAY 30 MINUTES: CPT | Performed by: NURSE PRACTITIONER

## 2022-01-31 PROCEDURE — 85018 HEMOGLOBIN: CPT | Performed by: SURGERY

## 2022-01-31 PROCEDURE — 83690 ASSAY OF LIPASE: CPT | Performed by: FAMILY MEDICINE

## 2022-01-31 PROCEDURE — 80306 DRUG TEST PRSMV INSTRMNT: CPT | Performed by: NURSE PRACTITIONER

## 2022-01-31 PROCEDURE — 25010000002 THIAMINE PER 100 MG: Performed by: INTERNAL MEDICINE

## 2022-01-31 PROCEDURE — 80053 COMPREHEN METABOLIC PANEL: CPT | Performed by: FAMILY MEDICINE

## 2022-01-31 PROCEDURE — 25010000002 METOCLOPRAMIDE PER 10 MG: Performed by: SURGERY

## 2022-01-31 PROCEDURE — G0378 HOSPITAL OBSERVATION PER HR: HCPCS

## 2022-01-31 PROCEDURE — 70450 CT HEAD/BRAIN W/O DYE: CPT

## 2022-01-31 PROCEDURE — 25010000002 ONDANSETRON PER 1 MG: Performed by: SURGERY

## 2022-01-31 RX ORDER — METOCLOPRAMIDE HYDROCHLORIDE 5 MG/ML
10 INJECTION INTRAMUSCULAR; INTRAVENOUS
Status: DISCONTINUED | OUTPATIENT
Start: 2022-01-31 | End: 2022-02-02 | Stop reason: HOSPADM

## 2022-01-31 RX ORDER — METOCLOPRAMIDE HYDROCHLORIDE 5 MG/ML
10 INJECTION INTRAMUSCULAR; INTRAVENOUS
Status: DISCONTINUED | OUTPATIENT
Start: 2022-01-31 | End: 2022-01-31

## 2022-01-31 RX ORDER — POTASSIUM CHLORIDE 7.45 MG/ML
10 INJECTION INTRAVENOUS
Status: COMPLETED | OUTPATIENT
Start: 2022-01-31 | End: 2022-01-31

## 2022-01-31 RX ORDER — ONDANSETRON 4 MG/1
4 TABLET, FILM COATED ORAL EVERY 8 HOURS PRN
COMMUNITY

## 2022-01-31 RX ORDER — DEXTROSE, SODIUM CHLORIDE, AND POTASSIUM CHLORIDE 5; .9; .15 G/100ML; G/100ML; G/100ML
125 INJECTION INTRAVENOUS CONTINUOUS
Status: DISCONTINUED | OUTPATIENT
Start: 2022-01-31 | End: 2022-02-02 | Stop reason: HOSPADM

## 2022-01-31 RX ORDER — MECLIZINE HYDROCHLORIDE 25 MG/1
25 TABLET ORAL 3 TIMES DAILY PRN
Status: DISCONTINUED | OUTPATIENT
Start: 2022-01-31 | End: 2022-02-02 | Stop reason: HOSPADM

## 2022-01-31 RX ADMIN — ONDANSETRON 4 MG: 2 INJECTION INTRAMUSCULAR; INTRAVENOUS at 13:34

## 2022-01-31 RX ADMIN — SODIUM CHLORIDE, PRESERVATIVE FREE 3 ML: 5 INJECTION INTRAVENOUS at 21:47

## 2022-01-31 RX ADMIN — METOCLOPRAMIDE 10 MG: 5 INJECTION, SOLUTION INTRAMUSCULAR; INTRAVENOUS at 06:16

## 2022-01-31 RX ADMIN — POTASSIUM CHLORIDE 10 MEQ: 10 INJECTION, SOLUTION INTRAVENOUS at 15:18

## 2022-01-31 RX ADMIN — ONDANSETRON 4 MG: 2 INJECTION INTRAMUSCULAR; INTRAVENOUS at 00:07

## 2022-01-31 RX ADMIN — DEXTROSE AND SODIUM CHLORIDE 100 ML/HR: 5; 450 INJECTION, SOLUTION INTRAVENOUS at 06:21

## 2022-01-31 RX ADMIN — HYDROCODONE BITARTRATE AND ACETAMINOPHEN 1 TABLET: 7.5; 325 TABLET ORAL at 00:03

## 2022-01-31 RX ADMIN — POTASSIUM CHLORIDE, DEXTROSE MONOHYDRATE AND SODIUM CHLORIDE 125 ML/HR: 150; 5; 900 INJECTION, SOLUTION INTRAVENOUS at 11:57

## 2022-01-31 RX ADMIN — HYDROMORPHONE HYDROCHLORIDE 0.5 MG: 1 INJECTION, SOLUTION INTRAMUSCULAR; INTRAVENOUS; SUBCUTANEOUS at 02:04

## 2022-01-31 RX ADMIN — METOCLOPRAMIDE 10 MG: 5 INJECTION, SOLUTION INTRAMUSCULAR; INTRAVENOUS at 21:45

## 2022-01-31 RX ADMIN — FAMOTIDINE 20 MG: 10 INJECTION INTRAVENOUS at 21:45

## 2022-01-31 RX ADMIN — THIAMINE HYDROCHLORIDE 500 MG: 100 INJECTION, SOLUTION INTRAMUSCULAR; INTRAVENOUS at 21:46

## 2022-01-31 RX ADMIN — METOCLOPRAMIDE 10 MG: 5 INJECTION, SOLUTION INTRAMUSCULAR; INTRAVENOUS at 11:57

## 2022-01-31 RX ADMIN — POTASSIUM CHLORIDE 10 MEQ: 10 INJECTION, SOLUTION INTRAVENOUS at 16:53

## 2022-01-31 RX ADMIN — POTASSIUM CHLORIDE 10 MEQ: 10 INJECTION, SOLUTION INTRAVENOUS at 19:05

## 2022-01-31 RX ADMIN — METOCLOPRAMIDE 10 MG: 5 INJECTION, SOLUTION INTRAMUSCULAR; INTRAVENOUS at 16:53

## 2022-01-31 RX ADMIN — SODIUM CHLORIDE, PRESERVATIVE FREE 3 ML: 5 INJECTION INTRAVENOUS at 21:46

## 2022-01-31 RX ADMIN — FAMOTIDINE 20 MG: 10 INJECTION INTRAVENOUS at 08:04

## 2022-01-31 RX ADMIN — POTASSIUM CHLORIDE 10 MEQ: 10 INJECTION, SOLUTION INTRAVENOUS at 20:00

## 2022-02-01 ENCOUNTER — APPOINTMENT (OUTPATIENT)
Dept: MRI IMAGING | Facility: HOSPITAL | Age: 27
End: 2022-02-01

## 2022-02-01 LAB
ANION GAP SERPL CALCULATED.3IONS-SCNC: 8.8 MMOL/L (ref 5–15)
BASOPHILS # BLD AUTO: 0.04 10*3/MM3 (ref 0–0.2)
BASOPHILS NFR BLD AUTO: 0.9 % (ref 0–1.5)
BUN SERPL-MCNC: 4 MG/DL (ref 6–20)
BUN/CREAT SERPL: 8.3 (ref 7–25)
CALCIUM SPEC-SCNC: 8.5 MG/DL (ref 8.6–10.5)
CHLORIDE SERPL-SCNC: 101 MMOL/L (ref 98–107)
CO2 SERPL-SCNC: 31.2 MMOL/L (ref 22–29)
CREAT SERPL-MCNC: 0.48 MG/DL (ref 0.76–1.27)
DEPRECATED RDW RBC AUTO: 42.9 FL (ref 37–54)
EOSINOPHIL # BLD AUTO: 0.04 10*3/MM3 (ref 0–0.4)
EOSINOPHIL NFR BLD AUTO: 0.9 % (ref 0.3–6.2)
ERYTHROCYTE [DISTWIDTH] IN BLOOD BY AUTOMATED COUNT: 14 % (ref 12.3–15.4)
GFR SERPL CREATININE-BSD FRML MDRD: >150 ML/MIN/1.73
GLUCOSE SERPL-MCNC: 101 MG/DL (ref 65–99)
HCT VFR BLD AUTO: 32.1 % (ref 37.5–51)
HGB BLD-MCNC: 10.8 G/DL (ref 13–17.7)
IMM GRANULOCYTES # BLD AUTO: 0.01 10*3/MM3 (ref 0–0.05)
IMM GRANULOCYTES NFR BLD AUTO: 0.2 % (ref 0–0.5)
LYMPHOCYTES # BLD AUTO: 1.04 10*3/MM3 (ref 0.7–3.1)
LYMPHOCYTES NFR BLD AUTO: 22.4 % (ref 19.6–45.3)
MAGNESIUM SERPL-MCNC: 1.9 MG/DL (ref 1.6–2.6)
MCH RBC QN AUTO: 28.6 PG (ref 26.6–33)
MCHC RBC AUTO-ENTMCNC: 33.6 G/DL (ref 31.5–35.7)
MCV RBC AUTO: 85.1 FL (ref 79–97)
MONOCYTES # BLD AUTO: 0.58 10*3/MM3 (ref 0.1–0.9)
MONOCYTES NFR BLD AUTO: 12.5 % (ref 5–12)
NEUTROPHILS NFR BLD AUTO: 2.94 10*3/MM3 (ref 1.7–7)
NEUTROPHILS NFR BLD AUTO: 63.1 % (ref 42.7–76)
NRBC BLD AUTO-RTO: 0 /100 WBC (ref 0–0.2)
PLATELET # BLD AUTO: 193 10*3/MM3 (ref 140–450)
PMV BLD AUTO: 10.5 FL (ref 6–12)
POTASSIUM SERPL-SCNC: 3.2 MMOL/L (ref 3.5–5.2)
POTASSIUM SERPL-SCNC: 3.2 MMOL/L (ref 3.5–5.2)
RBC # BLD AUTO: 3.77 10*6/MM3 (ref 4.14–5.8)
SODIUM SERPL-SCNC: 141 MMOL/L (ref 136–145)
WBC NRBC COR # BLD: 4.65 10*3/MM3 (ref 3.4–10.8)

## 2022-02-01 PROCEDURE — 85025 COMPLETE CBC W/AUTO DIFF WBC: CPT | Performed by: STUDENT IN AN ORGANIZED HEALTH CARE EDUCATION/TRAINING PROGRAM

## 2022-02-01 PROCEDURE — 84132 ASSAY OF SERUM POTASSIUM: CPT | Performed by: STUDENT IN AN ORGANIZED HEALTH CARE EDUCATION/TRAINING PROGRAM

## 2022-02-01 PROCEDURE — 0 MAGNESIUM SULFATE 4 GM/100ML SOLUTION: Performed by: STUDENT IN AN ORGANIZED HEALTH CARE EDUCATION/TRAINING PROGRAM

## 2022-02-01 PROCEDURE — G0378 HOSPITAL OBSERVATION PER HR: HCPCS

## 2022-02-01 PROCEDURE — 25010000002 THIAMINE PER 100 MG: Performed by: INTERNAL MEDICINE

## 2022-02-01 PROCEDURE — 0 POTASSIUM CHLORIDE 10 MEQ/100ML SOLUTION: Performed by: STUDENT IN AN ORGANIZED HEALTH CARE EDUCATION/TRAINING PROGRAM

## 2022-02-01 PROCEDURE — 99226 PR SBSQ OBSERVATION CARE/DAY 35 MINUTES: CPT | Performed by: STUDENT IN AN ORGANIZED HEALTH CARE EDUCATION/TRAINING PROGRAM

## 2022-02-01 PROCEDURE — 97161 PT EVAL LOW COMPLEX 20 MIN: CPT

## 2022-02-01 PROCEDURE — 25010000002 ONDANSETRON PER 1 MG: Performed by: SURGERY

## 2022-02-01 PROCEDURE — 80048 BASIC METABOLIC PNL TOTAL CA: CPT | Performed by: STUDENT IN AN ORGANIZED HEALTH CARE EDUCATION/TRAINING PROGRAM

## 2022-02-01 PROCEDURE — 70551 MRI BRAIN STEM W/O DYE: CPT

## 2022-02-01 PROCEDURE — 99024 POSTOP FOLLOW-UP VISIT: CPT | Performed by: SURGERY

## 2022-02-01 PROCEDURE — 83735 ASSAY OF MAGNESIUM: CPT | Performed by: STUDENT IN AN ORGANIZED HEALTH CARE EDUCATION/TRAINING PROGRAM

## 2022-02-01 PROCEDURE — 25010000002 METOCLOPRAMIDE PER 10 MG: Performed by: SURGERY

## 2022-02-01 RX ORDER — POTASSIUM CHLORIDE 7.45 MG/ML
10 INJECTION INTRAVENOUS
Status: COMPLETED | OUTPATIENT
Start: 2022-02-01 | End: 2022-02-02

## 2022-02-01 RX ORDER — POTASSIUM CHLORIDE 7.45 MG/ML
10 INJECTION INTRAVENOUS
Status: DISCONTINUED | OUTPATIENT
Start: 2022-02-01 | End: 2022-02-02 | Stop reason: HOSPADM

## 2022-02-01 RX ORDER — POTASSIUM CHLORIDE 7.45 MG/ML
10 INJECTION INTRAVENOUS
Status: DISCONTINUED | OUTPATIENT
Start: 2022-02-01 | End: 2022-02-01

## 2022-02-01 RX ORDER — MAGNESIUM SULFATE HEPTAHYDRATE 40 MG/ML
4 INJECTION, SOLUTION INTRAVENOUS ONCE
Status: COMPLETED | OUTPATIENT
Start: 2022-02-01 | End: 2022-02-01

## 2022-02-01 RX ORDER — POTASSIUM CHLORIDE 7.45 MG/ML
10 INJECTION INTRAVENOUS
Status: COMPLETED | OUTPATIENT
Start: 2022-02-01 | End: 2022-02-01

## 2022-02-01 RX ORDER — MAGNESIUM SULFATE HEPTAHYDRATE 40 MG/ML
2 INJECTION, SOLUTION INTRAVENOUS AS NEEDED
Status: DISCONTINUED | OUTPATIENT
Start: 2022-02-01 | End: 2022-02-02 | Stop reason: HOSPADM

## 2022-02-01 RX ORDER — POTASSIUM CHLORIDE 1.5 G/1.77G
40 POWDER, FOR SOLUTION ORAL AS NEEDED
Status: DISCONTINUED | OUTPATIENT
Start: 2022-02-01 | End: 2022-02-02 | Stop reason: HOSPADM

## 2022-02-01 RX ORDER — POTASSIUM CHLORIDE 750 MG/1
40 CAPSULE, EXTENDED RELEASE ORAL AS NEEDED
Status: DISCONTINUED | OUTPATIENT
Start: 2022-02-01 | End: 2022-02-02 | Stop reason: HOSPADM

## 2022-02-01 RX ORDER — MAGNESIUM SULFATE HEPTAHYDRATE 40 MG/ML
4 INJECTION, SOLUTION INTRAVENOUS AS NEEDED
Status: DISCONTINUED | OUTPATIENT
Start: 2022-02-01 | End: 2022-02-02 | Stop reason: HOSPADM

## 2022-02-01 RX ADMIN — POTASSIUM CHLORIDE 10 MEQ: 7.46 INJECTION, SOLUTION INTRAVENOUS at 22:29

## 2022-02-01 RX ADMIN — POTASSIUM CHLORIDE 10 MEQ: 7.46 INJECTION, SOLUTION INTRAVENOUS at 23:24

## 2022-02-01 RX ADMIN — THIAMINE HYDROCHLORIDE 500 MG: 100 INJECTION, SOLUTION INTRAMUSCULAR; INTRAVENOUS at 17:00

## 2022-02-01 RX ADMIN — SODIUM CHLORIDE, PRESERVATIVE FREE 3 ML: 5 INJECTION INTRAVENOUS at 10:39

## 2022-02-01 RX ADMIN — POTASSIUM CHLORIDE 10 MEQ: 7.46 INJECTION, SOLUTION INTRAVENOUS at 09:55

## 2022-02-01 RX ADMIN — POTASSIUM CHLORIDE, DEXTROSE MONOHYDRATE AND SODIUM CHLORIDE 125 ML/HR: 150; 5; 900 INJECTION, SOLUTION INTRAVENOUS at 02:58

## 2022-02-01 RX ADMIN — POTASSIUM CHLORIDE, DEXTROSE MONOHYDRATE AND SODIUM CHLORIDE 125 ML/HR: 150; 5; 900 INJECTION, SOLUTION INTRAVENOUS at 19:27

## 2022-02-01 RX ADMIN — POTASSIUM CHLORIDE 10 MEQ: 7.46 INJECTION, SOLUTION INTRAVENOUS at 13:04

## 2022-02-01 RX ADMIN — POTASSIUM CHLORIDE 10 MEQ: 7.46 INJECTION, SOLUTION INTRAVENOUS at 11:57

## 2022-02-01 RX ADMIN — ONDANSETRON 4 MG: 2 INJECTION INTRAMUSCULAR; INTRAVENOUS at 08:07

## 2022-02-01 RX ADMIN — POTASSIUM CHLORIDE 10 MEQ: 7.46 INJECTION, SOLUTION INTRAVENOUS at 10:53

## 2022-02-01 RX ADMIN — FAMOTIDINE 20 MG: 10 INJECTION INTRAVENOUS at 21:16

## 2022-02-01 RX ADMIN — METOCLOPRAMIDE 10 MG: 5 INJECTION, SOLUTION INTRAMUSCULAR; INTRAVENOUS at 21:16

## 2022-02-01 RX ADMIN — SODIUM CHLORIDE, PRESERVATIVE FREE 3 ML: 5 INJECTION INTRAVENOUS at 21:20

## 2022-02-01 RX ADMIN — THIAMINE HYDROCHLORIDE 500 MG: 100 INJECTION, SOLUTION INTRAMUSCULAR; INTRAVENOUS at 10:27

## 2022-02-01 RX ADMIN — FAMOTIDINE 20 MG: 10 INJECTION INTRAVENOUS at 10:27

## 2022-02-01 RX ADMIN — THIAMINE HYDROCHLORIDE 500 MG: 100 INJECTION, SOLUTION INTRAMUSCULAR; INTRAVENOUS at 21:17

## 2022-02-01 RX ADMIN — POTASSIUM CHLORIDE, DEXTROSE MONOHYDRATE AND SODIUM CHLORIDE 125 ML/HR: 150; 5; 900 INJECTION, SOLUTION INTRAVENOUS at 11:39

## 2022-02-01 RX ADMIN — METOCLOPRAMIDE 10 MG: 5 INJECTION, SOLUTION INTRAMUSCULAR; INTRAVENOUS at 05:50

## 2022-02-01 RX ADMIN — MAGNESIUM SULFATE HEPTAHYDRATE 4 G: 40 INJECTION, SOLUTION INTRAVENOUS at 13:04

## 2022-02-01 RX ADMIN — METOCLOPRAMIDE 10 MG: 5 INJECTION, SOLUTION INTRAMUSCULAR; INTRAVENOUS at 17:01

## 2022-02-01 RX ADMIN — POTASSIUM CHLORIDE 10 MEQ: 7.46 INJECTION, SOLUTION INTRAVENOUS at 21:16

## 2022-02-01 NOTE — PLAN OF CARE
Goal Outcome Evaluation: Pt tolerated 120ml of jello without nausea and vomiting. Pt ambulated in hallway with PT.

## 2022-02-01 NOTE — PROGRESS NOTES
LOS: 0 days   Patient Care Team:  Lakeshia Yap MD as PCP - General (Family Medicine)      Chief Complaint: Postop laparoscopy with evacuation hematoma      Interval History: Patient still having nausea and vomiting.  Even with clear liquids.  Also having visual issues and slight elevation of lipase.  He also is having his potassium level corrected by hospitalist.    Patient Complaints: None    History taken from: patient    Vital Signs  Temp:  [96.7 °F (35.9 °C)-98.4 °F (36.9 °C)] 97.3 °F (36.3 °C)  Heart Rate:  [70-97] 97  Resp:  [20-27] 20  BP: (118-127)/(70-78) 118/70    Physical Exam:     General Appearance:    Alert, cooperative, in no acute distress   Head:    Normocephalic, without obvious abnormality, atraumatic   Lungs:     Clear to auscultation,respirations regular, even and                  unlabored    Heart:    Regular rhythm and normal rate, normal S1 and S2, no            murmur, no gallop, no rub, no click   Abdomen:     Normal bowel sounds, no masses, no organomegaly, soft        non-tender, non-distended, no guarding, no rebound                Tenderness.  Wounds look good with no redness or drainage.   Extremities:   Moves all extremities well, no edema, no cyanosis, no             redness   Pulses:   Pulses palpable and equal bilaterally   Skin:   No bleeding, bruising or rash        Results Review:       Lab Results (last 24 hours)     Procedure Component Value Units Date/Time    CBC & Differential [[id number]]  (Abnormal) Collected: 02/01/22 0746    Specimen: Blood Updated: 02/01/22 2723    Narrative:      The following orders were created for panel order CBC & Differential.  Procedure                               Abnormality         Status                     ---------                               -----------         ------                     CBC Auto Differential[[id number]]        Abnormal            Final result                 Please view results for these tests on the  individual orders.    CBC Auto Differential [[id number]]  (Abnormal) Collected: 02/01/22 0746    Specimen: Blood Updated: 02/01/22 0757     WBC 4.65 10*3/mm3      RBC 3.77 10*6/mm3      Hemoglobin 10.8 g/dL      Hematocrit 32.1 %      MCV 85.1 fL      MCH 28.6 pg      MCHC 33.6 g/dL      RDW 14.0 %      RDW-SD 42.9 fl      MPV 10.5 fL      Platelets 193 10*3/mm3      Neutrophil % 63.1 %      Lymphocyte % 22.4 %      Monocyte % 12.5 %      Eosinophil % 0.9 %      Basophil % 0.9 %      Immature Grans % 0.2 %      Neutrophils, Absolute 2.94 10*3/mm3      Lymphocytes, Absolute 1.04 10*3/mm3      Monocytes, Absolute 0.58 10*3/mm3      Eosinophils, Absolute 0.04 10*3/mm3      Basophils, Absolute 0.04 10*3/mm3      Immature Grans, Absolute 0.01 10*3/mm3      nRBC 0.0 /100 WBC     Basic Metabolic Panel [[id number]] Collected: 02/01/22 0746    Specimen: Blood Updated: 02/01/22 0753    Urine Drug Screen - Urine, Clean Catch [[id number]]  (Abnormal) Collected: 01/31/22 2031    Specimen: Urine, Clean Catch Updated: 01/31/22 2055     THC, Screen, Urine Negative     Phencyclidine (PCP), Urine Negative     Cocaine Screen, Urine Negative     Methamphetamine, Ur Negative     Opiate Screen Positive     Amphetamine Screen, Urine Negative     Benzodiazepine Screen, Urine Negative     Tricyclic Antidepressants Screen Negative     Methadone Screen, Urine Negative     Barbiturates Screen, Urine Negative     Oxycodone Screen, Urine Negative     Propoxyphene Screen Negative     Buprenorphine, Screen, Urine Negative    Narrative:      Limitations of this procedure include the possibility of false positives due to interfering substances in the urine sample. Clinical data should be correlated with any questionable result. Positive results should be considered Presumptive Positive until results are confirmed with another methodology such as HPLC or GCMS.    Comprehensive Metabolic Panel [[id number]]  (Abnormal) Collected: 01/31/22 1131    Specimen:  Blood Updated: 01/31/22 1119     Glucose 107 mg/dL      BUN 4 mg/dL      Creatinine 0.54 mg/dL      Sodium 137 mmol/L      Potassium 2.9 mmol/L      Chloride 92 mmol/L      CO2 34.8 mmol/L      Calcium 8.8 mg/dL      Total Protein 5.8 g/dL      Albumin 3.70 g/dL      ALT (SGPT) 55 U/L      AST (SGOT) 70 U/L      Alkaline Phosphatase 108 U/L      Total Bilirubin 1.2 mg/dL      eGFR Non African Amer >150 mL/min/1.73      Globulin 2.1 gm/dL      A/G Ratio 1.8 g/dL      BUN/Creatinine Ratio 7.4     Anion Gap 10.2 mmol/L     Narrative:      GFR Normal >60  Chronic Kidney Disease <60  Kidney Failure <15      Lipase [[id number]]  (Abnormal) Collected: 01/31/22 1131    Specimen: Blood Updated: 01/31/22 1118     Lipase 154 U/L     Magnesium [[id number]]  (Normal) Collected: 01/31/22 1131    Specimen: Blood Updated: 01/31/22 1118     Magnesium 2.0 mg/dL               Assessment/Plan       Intraabdominal hemorrhage    Nystagmus due to benign paroxysmal positional vertigo      Postop, continues to have multiple issues.  Not ready for discharge this point.  Continue clear liquids and advance diet as tolerated.      Sonya Christopher MD  02/01/22  08:11 EST

## 2022-02-01 NOTE — PROGRESS NOTES
"    [facility] HOSPITALIST    PROGRESS NOTE    Name:  Milton Taylor   Age:  27 y.o.  Sex:  male  :  1995  MRN:  [id number]   Visit Number:  [id number]  Admission Date:  2022  Date Of Service:  22  Primary Care Physician:  Lakeshia Yap MD     LOS: 0 days :    Chief Complaint:      Nausea, vomiting    Subjective:    Patient seen and examined at bedside this morning. Chart reviewed and events noted. Patient complains of feeling weak and tired. He continues to complain of \"dizziness and eye changes.\" Denies blurred vision or headache.    Hospital Course:    27 year old male with no significant PMH. Presented to ER for intractable Nausea, vomiting,and ABD pain. Patient did have recent lacey on . He states that since he has been home, he had not been able to eat or drink with worsening ABD pain. Patient was noted to have significant blood in the liver and hepatic fossa consistent with postop bleed. Patient was also noted to have a drop in hemoglobin from 17 to 13. Subsequently, patient underwent diagnostic lap with evacuation of hematoma with drain placed.Patient continues to have intractable N/V with ABD pain mostly in RUQ. Hgb remains stable at 10.9 this am. The Hospitalist service was consulted given the patients \"new eye problem\". Patient states that he started having involuntary eye twitching early 22. Patient states he is unable to focus with his eyes. He did have Dilaudid this morning for which the patient states that he was not able to \"hold my eyes open\". Dilaudid was then discontinued. Patient then refused all of his oral pain medications because he does not like being overly sedated. He states that when he opens his eyes, vision is blurred and room appears to be \"spinning\". He does wear prescription lenses, however, states vision is usually not this bad. He denies history of vertigo. He denies unilateral weakness. Patient states that he can't walk because of " his balance issues as well, which has been an issue since after his first surgery 5 days ago.    Of note, patient states that he does have history of Opiate Abuse in the past with Percocet. He denies IV drug abuse. He states that he has maintained his sobriety for the past couple of years.    CT head obtained on 1/31/22 which revealed no acute intracranial process and recommended MRI if symptoms persist.     Review of Systems:     All systems were reviewed and negative except as mentioned in subjective, assessment and plan.    Vital Signs:    Temp:  [96.7 °F (35.9 °C)-98.4 °F (36.9 °C)] 97.4 °F (36.3 °C)  Heart Rate:  [70-97] 88  Resp:  [20-27] 20  BP: (117-127)/(70-79) 117/79    Intake and output:    I/O last 3 completed shifts:  In: 3713.3 [P.O.:480; I.V.:3233.3]  Out: 600 [Urine:600]  I/O this shift:  In: 240 [P.O.:240]  Out: -     Physical Examination:    General Appearance:  Alert and cooperative. Lying in bed, in NAD.   Head:  Atraumatic and normocephalic.   Eyes: Bilateral up and down repetitive movement of eyes, exaggerated when physician is speaking with patient, and tones down when physician outside of the room. All extraoccular muscles intact. PERRL.    Throat: No oral lesions, no thrush, oral mucosa moist.   Neck: Supple, trachea midline, no thyromegaly.   Lungs:   Breath sounds heard bilaterally equally.  No wheezing or crackles. No Pleural rub or bronchial breathing.   Heart:  Normal S1 and S2, no murmur, no gallop, no rub. No JVD.   Abdomen:   Normal bowel sounds. Incisional sites dry without evidence of erythema, bleeding, or drainage.    Extremities: Supple, no edema, no cyanosis, no clubbing.   Skin: No bleeding or rash.   Neurologic: Alert and oriented x 3. No facial asymmetry. Moves all four limbs. No tremors.      Laboratory results:    Results from last 7 days   Lab Units 02/01/22  0746 01/31/22  1131 01/29/22  0536 01/28/22  1614 01/28/22  1614   SODIUM mmol/L 141 137 135*   < > 134*    POTASSIUM mmol/L 3.2* 2.9* 2.7*   < > 2.8*   CHLORIDE mmol/L 101 92* 90*   < > 88*   CO2 mmol/L 31.2* 34.8* 31.7*   < > 29.2*   BUN mg/dL 4* 4* 7   < > 9   CREATININE mg/dL 0.48* 0.54* 0.69*   < > 0.69*   CALCIUM mg/dL 8.5* 8.8 8.6   < > 9.4   BILIRUBIN mg/dL  --  1.2 0.7  --  0.9   ALK PHOS U/L  --  108 70  --  71   ALT (SGPT) U/L  --  55* 23  --  24   AST (SGOT) U/L  --  70* 26  --  29   GLUCOSE mg/dL 101* 107* 109*   < > 106*    < > = values in this interval not displayed.     Results from last 7 days   Lab Units 02/01/22  0746 01/31/22  0726 01/30/22  0532 01/29/22  0536 01/29/22  0536   WBC 10*3/mm3 4.65  --  4.99  --  5.28   HEMOGLOBIN g/dL 10.8* 11.7* 10.9*   < > 11.5*   HEMATOCRIT % 32.1* 34.1* 31.5*   < > 32.7*   PLATELETS 10*3/mm3 193  --  178  --  169    < > = values in this interval not displayed.                     I have reviewed the patient's laboratory results.    Radiology results:    CT Head Without Contrast    Result Date: 1/31/2022  PROCEDURE: CT HEAD WO CONTRAST-  HISTORY: Neuro deficit, acute, stroke suspected; [id number], not elsewhere classified  COMPARISON:  None .  TECHNIQUE: Multiple axial CT images were performed from the foramen magnum to the vertex without enhancement.  FINDINGS: There is no CT evidence of hemorrhage. There is no mass, mass effect or midline shift.  There is no hydrocephalus. The paranasal sinuses are clear. Bone windows reveal no acute osseous abnormalities.      Impression: No acute intracranial process. If clinical symptoms persist consider an MRI.    739.55 mGy.cm   This study was performed with techniques to keep radiation doses as low as reasonably achievable (ALARA). Individualized dose reduction techniques using automated exposure control or adjustment of mA and/or kV according to the patient size were employed.  This report was finalized on 1/31/2022 4:27 PM by Michelle Yo M.D..    I have reviewed the patient's radiology reports.    Medication  Review:     I have reviewed the patient's active and prn medications.     Problem List:      Intraabdominal hemorrhage    Nystagmus due to benign paroxysmal positional vertigo      Assessment:    1. Questionable Vertical Nystagmus, concern for Factitious Etiology  2. Benign Paroxysmal Positional Vertigo  3. Intra-Abdominal Hemorrhage s/p Laparoscopic Evacuation of Hematoma  4. S/p Laparoscopic Cholecystectomy 1/26/22  5. Hypokalemia  6. Intractable Nausea, Vomiting    Plan:    Questionable Nystagmus  Vertigo  - The patient's nystagmus seems to be exaggerated when being examined by physician and seems to dissipate when medical staff not in the room.  Patient does not have any focal neurologic deficits which lead me to suspect stroke or other neurologic insult.  - CT Head negative   - Will proceed with MRI Brain without contrast to definitively rule out organic process/etiology contributing to this eye behavior.  - I do suspect that this is all factitious, and that the patient is inducing this eye movement.   - The dizziness he is feeling is likely secondary to the over use of his extraocular muscles while moving his eyes up and down. This could also be secondary to BPPV  - In any case, continue Meclizine  - Await results of MRI. May consider neurology consultation, but will await results of MRI first.  - Continue with PT/OT    S/p Lap Melissa  Intra-abdominal Hemorrhage s/p Lap Evacuation  - Hemoglobin has remained stable    Hypokalemia  Intractable Nausea, Vomiting  - Replete K  - Check Mag  - Antiemetics prn    DVT Prophylaxis: SCDs  Code Status: Full  Diet: As tolerated  Discharge Plan: To be determined    Carolina Rich DO  02/01/22  10:10 EST    Dictated utilizing Dragon dictation.

## 2022-02-01 NOTE — PLAN OF CARE
Goal Outcome Evaluation:  Plan of Care Reviewed With: patient           Outcome Summary: PT evaluation completed this date. Pt was cooperative but expressed fear of being too dizzy to walk. Bed mobility with CGA to sit and SBA to return to supine. Pt also amb with min assist with rolling walker 110 ft with wide THIAGO and cues for focusing vision for gait. Pt presents with deficits in balance and endurance. Pt expected to improve his motor skills with continued PT services.

## 2022-02-01 NOTE — PLAN OF CARE
Problem: Adult Inpatient Plan of Care  Goal: Plan of Care Review  Outcome: Ongoing, Progressing  Flowsheets (Taken 2/1/2022 0126)  Plan of Care Reviewed With: patient  Goal: Patient-Specific Goal (Individualized)  Outcome: Ongoing, Progressing  Goal: Absence of Hospital-Acquired Illness or Injury  Outcome: Ongoing, Progressing  Intervention: Identify and Manage Fall Risk  Recent Flowsheet Documentation  Taken 1/31/2022 1900 by Mukund Malcolm RN  Safety Promotion/Fall Prevention:   activity supervised   mobility aid in reach   nonskid shoes/slippers when out of bed   room organization consistent   safety round/check completed  Intervention: Prevent Skin Injury  Recent Flowsheet Documentation  Taken 1/31/2022 1900 by Mukund Malcolm RN  Body Position: position changed independently  Skin Protection: incontinence pads utilized  Intervention: Prevent Infection  Recent Flowsheet Documentation  Taken 1/31/2022 1900 by Mukund Malcolm RN  Infection Prevention: single patient room provided  Goal: Optimal Comfort and Wellbeing  Outcome: Ongoing, Progressing  Intervention: Provide Person-Centered Care  Recent Flowsheet Documentation  Taken 1/31/2022 1900 by Mukund Malcolm RN  Trust Relationship/Rapport:   care explained   choices provided  Goal: Readiness for Transition of Care  Outcome: Ongoing, Progressing     Problem: Infection  Goal: Infection Symptom Resolution  Outcome: Ongoing, Progressing     Problem: Pain Acute  Goal: Optimal Pain Control  Outcome: Ongoing, Progressing  Intervention: Prevent or Manage Pain  Recent Flowsheet Documentation  Taken 1/31/2022 1900 by Mukund Malcolm RN  Sensory Stimulation Regulation: care clustered  Intervention: Optimize Psychosocial Wellbeing  Recent Flowsheet Documentation  Taken 1/31/2022 1900 by Mukund Malcolm RN  Supportive Measures: active listening utilized  Diversional Activities:   television   smartphone  Spiritual Activities Assistance:    spiritual support provided   prayer provided   Goal Outcome Evaluation:  Plan of Care Reviewed With: patient

## 2022-02-01 NOTE — THERAPY EVALUATION
"Patient Name: Milton Taylor  : 1995    MRN: [id number]                              Today's Date: 2022       Admit Date: 2022    Visit Dx:     ICD-10-CM ICD-9-CM   1. Intraabdominal hemorrhage  R58 459.0     Patient Active Problem List   Diagnosis   • Biliary dyskinesia   • Nausea   • Right upper quadrant abdominal pain   • Dehydration   • Intraabdominal hemorrhage   • Nystagmus due to benign paroxysmal positional vertigo     Past Medical History:   Diagnosis Date   • Anxiety    • Chest pain     Intermittent chest pain for the last 7 to 8 years. Symptoms last for about a day at a time. Has radiated down his left arm in the past. Nothing known to relieve the pain, just \"goes away on its own\"   • Depression    • GERD (gastroesophageal reflux disease)    • PONV (postoperative nausea and vomiting)    • Scoliosis    • Sleep apnea     does not currently have a CPAP machine, has had one in the past   • Wears glasses      Past Surgical History:   Procedure Laterality Date   • CHOLECYSTECTOMY WITH INTRAOPERATIVE CHOLANGIOGRAM N/A 2022    Procedure: CHOLECYSTECTOMY LAPAROSCOPIC INTRAOPERATIVE CHOLANGIOGRAPHY;  Surgeon: Nj De Jesus MD;  Location: [facility];  Service: General;  Laterality: N/A;   • DIAGNOSTIC LAPAROSCOPY N/A 2022    Procedure: DIAGNOSTIC LAPAROSCOPY evacuation of hematoma and control bleeding of liver bed;  Surgeon: Sonya Christopher MD;  Location: [facility];  Service: General;  Laterality: N/A;   • EAR TUBES     • WISDOM TOOTH EXTRACTION      x4      General Information     Row Name 22 1135          Physical Therapy Time and Intention    Mode of Treatment physical therapy  -TW     Row Name 22 1135          General Information    Patient Profile Reviewed yes  -TW     Prior Level of Function independent:; all household mobility; community mobility  with no assistive device  -TW     Existing Precautions/Restrictions fall; oxygen therapy device and L/min  -TW     " Barriers to Rehab ineffective coping  -[facility] Name 02/01/22 1135          Living Environment    Lives With parent(s)  -[facility] Name 02/01/22 1135          Home Main Entrance    Number of Stairs, Main Entrance three  -TW     Stair Railings, Main Entrance railing on left side (ascending)  -[facility] Name 02/01/22 1135          Stairs Within Home, Primary    Number of Stairs, Within Home, Primary none  -[facility] Name 02/01/22 1135          Cognition    Orientation Status (Cognition) oriented x 4  -[facility] Name 02/01/22 1135          Safety Issues, Functional Mobility    Safety Issues Affecting Function (Mobility) insight into deficits/self-awareness; safety precaution awareness; safety precautions follow-through/compliance  -     Impairments Affecting Function (Mobility) balance; endurance/activity tolerance  -     Comment, Safety Issues/Impairments (Mobility) Note that pt's eyes constantly looking up and down which does affect balance/gait  -TW           User Key  (r) = Recorded By, (t) = Taken By, (c) = Cosigned By    Initials Name Provider Type    TW Lenora Blake, PT Physical Therapist               Mobility     [facility] Name 02/01/22 1135          Bed Mobility    Bed Mobility sit-supine; supine-sit  -TW     Supine-Sit Suring (Bed Mobility) contact guard; verbal cues  -     Sit-Supine Suring (Bed Mobility) modified independence  -     Assistive Device (Bed Mobility) head of bed elevated  -     Comment (Bed Mobility) Pt did reach for therapist for assist to come to sit but pt encouraged to push on mattress instead of pulling up.  -[facility] Name 02/01/22 1135          Transfers    Comment (Transfers) Cues for handplacement and safety.  -[facility] Name 02/01/22 1135          Bed-Chair Transfer    Bed-Chair Suring (Transfers) not tested  -[facility] Name 02/01/22 1135          Sit-Stand Transfer    Sit-Stand Suring (Transfers) standby assist; verbal cues  -TW     Assistive  Device (Sit-Stand Transfers) walker, front-wheeled  -TW     Row Name 02/01/22 1135          Gait/Stairs (Locomotion)    Foley Level (Gait) contact guard; minimum assist (75% patient effort)  -TW     Assistive Device (Gait) walker, front-wheeled  -TW     Distance in Feet (Gait) 110 ft  -TW     Deviations/Abnormal Patterns (Gait) base of support, wide; stride length decreased; festinating/shuffling  -TW     Comment (Gait/Stairs) Due to pt's excessive eye movement decreased balance with turns.  -TW           User Key  (r) = Recorded By, (t) = Taken By, (c) = Cosigned By    Initials Name Provider Type    TW Lenora Blake, VENKATA Physical Therapist               Obj/Interventions     Row Name 02/01/22 1135          Range of Motion Comprehensive    Comment, General Range of Motion BLE grossly WFLs  -TW     Row Name 02/01/22 1135          Strength Comprehensive (MMT)    Comment, General Manual Muscle Testing (MMT) Assessment B LE grossly 3+/5 to 4+/5  -TW     Row Name 02/01/22 1135          Balance    Balance Assessment sitting static balance; sitting dynamic balance; standing static balance; standing dynamic balance  -TW     Static Sitting Balance WFL; unsupported; sitting, edge of bed  -TW     Dynamic Sitting Balance WFL; unsupported; sitting, edge of bed  -TW     Static Standing Balance WFL; supported  -TW     Dynamic Standing Balance mild impairment; supported  -TW     Comment, Balance Worked on focusing vision on nonmoving objects as he walked to assist balance.  -TW           User Key  (r) = Recorded By, (t) = Taken By, (c) = Cosigned By    Initials Name Provider Type     Lenora Blake, PT Physical Therapist               Goals/Plan     Row Name 02/01/22 1135          Bed Mobility Goal 1 (PT)    Activity/Assistive Device (Bed Mobility Goal 1, PT) bed mobility activities, all  -TW     Foley Level/Cues Needed (Bed Mobility Goal 1, PT) independent  -TW     Time Frame (Bed Mobility Goal 1, PT) short term goal  (STG); 5 days  -TW     Progress/Outcomes (Bed Mobility Goal 1, PT) goal ongoing  -TW     Row Name 02/01/22 1135          Transfer Goal 1 (PT)    Activity/Assistive Device (Transfer Goal 1, PT) sit-to-stand/stand-to-sit; bed-to-chair/chair-to-bed; toilet; walker, rolling  -TW     [place] Level/Cues Needed (Transfer Goal 1, PT) modified independence  -TW     Time Frame (Transfer Goal 1, PT) 1 week  -TW     Progress/Outcome (Transfer Goal 1, PT) goal ongoing  -TW     Row Name 02/01/22 1135          Gait Training Goal 1 (PT)    Activity/Assistive Device (Gait Training Goal 1, PT) gait (walking locomotion); decrease fall risk; increase endurance/gait distance; improve balance and speed  -TW     Distance (Gait Training Goal 1, PT) 250 ft  -TW     Time Frame (Gait Training Goal 1, PT) 1 week  -TW     Progress/Outcome (Gait Training Goal 1, PT) goal ongoing  -TW     Row Name 02/01/22 1135          Patient Education Goal (PT)    Activity (Patient Education Goal, PT) dynamic balance independent with mobility  -TW     [place]/Cues/Accuracy (Memory Goal 2, PT) demonstrates adequately; verbalizes understanding  -TW           User Key  (r) = Recorded By, (t) = Taken By, (c) = Cosigned By    Initials Name Provider Type    TW Lenora Blake, PT Physical Therapist               Clinical Impression     Row Name 02/01/22 1135          Pain    Additional Documentation Pain Scale: Numbers Pre/Post-Treatment (Group)  -[facility] Name 02/01/22 1135          Pain Scale: Numbers Pre/Post-Treatment    Pretreatment Pain Rating 0/10 - no pain  -TW     Posttreatment Pain Rating 0/10 - no pain  -     Row Name 02/01/22 1135          Plan of Care Review    Plan of Care Reviewed With patient  -TW     Outcome Summary PT evaluation completed this date. Pt was cooperative but expressed fear of being too dizzy to walk. Bed mobility with CGA to sit and SBA to return to supine. Pt also amb with min assist with rolling walker 110 ft with wide  THIAGO and cues for focusing vision for gait. Pt presents with deficits in balance and endurance. Pt expected to improve his motor skills with continued PT services.  -TW     Row Name 02/01/22 1135          Therapy Assessment/Plan (PT)    Patient/Family Therapy Goals Statement (PT) Pt wishes to return home with parents  -TW     Rehab Potential (PT) good, to achieve stated therapy goals  -TW     Criteria for Skilled Interventions Met (PT) yes; meets criteria  -TW     Predicted Duration of Therapy Intervention (PT) 1 wk  -TW     Row Name 02/01/22 1135          Vital Signs    Pre Systolic BP Rehab 117  -TW     Pre Treatment Diastolic BP 72  -TW     Intra Systolic BP Rehab 137  -TW     Intra Treatment Diastolic BP 93  -TW     Post Systolic BP Rehab 115  -TW     Post Treatment Diastolic BP 74  -TW     Pre SpO2 (%) 100  -TW     O2 Delivery Pre Treatment hi-flow  3 L  -TW     O2 Delivery Intra Treatment hi-flow  3 L  -TW     O2 Delivery Post Treatment hi-flow  3 L  -TW     Pre Patient Position Supine  -TW     Intra Patient Position Standing  -TW     Post Patient Position Supine  -TW     Row Name 02/01/22 1135          Positioning and Restraints    Pre-Treatment Position in bed  -TW     Post Treatment Position bed  -TW     In Bed call light within reach; encouraged to call for assist; notified nsg  -TW           User Key  (r) = Recorded By, (t) = Taken By, (c) = Cosigned By    Initials Name Provider Type    Lenora Sánchez, PT Physical Therapist               Outcome Measures     Row Name 02/01/22 1139          How much help from another person do you currently need...    Turning from your back to your side while in flat bed without using bedrails? 3  -TW     Moving from lying on back to sitting on the side of a flat bed without bedrails? 3  -TW     Moving to and from a bed to a chair (including a wheelchair)? 3  -TW     Standing up from a chair using your arms (e.g., wheelchair, bedside chair)? 3  -TW     Climbing 3-5 steps  with a railing? 2  -TW     To walk in hospital room? 3  -TW     AM-PAC 6 Clicks Score (PT) 17  -TW     Row Name 02/01/22 1135          Functional Assessment    Outcome Measure Options AM-PAC 6 Clicks Basic Mobility (PT)  -TW           User Key  (r) = Recorded By, (t) = Taken By, (c) = Cosigned By    Initials Name Provider Type    TW Lenora Blake PT Physical Therapist                             Physical Therapy Education                 Title: PT OT SLP Therapies (In Progress)     Topic: Physical Therapy (In Progress)     Point: Mobility training (Done)     Learning Progress Summary           Patient Acceptance, E,D, VU,NR by TW at 2/1/2022 1135    Comment: Pt educaiton for focusing vision during gait to assist with mobility                   Point: Body mechanics (Not Started)     Learner Progress:  Not documented in this visit.          Point: Precautions (Done)     Learning Progress Summary           Patient Acceptance, E,D, VU,NR by TW at 2/1/2022 1135    Comment: Pt educaiton for focusing vision during gait to assist with mobility                               User Key     Initials Effective Dates Name Provider Type [facility] 06/16/21 -  Lenora Blake PT Physical Therapist PT              PT Recommendation and Plan  Planned Therapy Interventions (PT): balance training, patient/family education, transfer training, gait training  Plan of Care Reviewed With: patient  Outcome Summary: PT evaluation completed this date. Pt was cooperative but expressed fear of being too dizzy to walk. Bed mobility with CGA to sit and SBA to return to supine. Pt also amb with min assist with rolling walker 110 ft with wide THIAGO and cues for focusing vision for gait. Pt presents with deficits in balance and endurance. Pt expected to improve his motor skills with continued PT services.     Time Calculation:    PT Charges     Row Name 02/01/22 1135             Time Calculation    Stop Time 1135  -TW      PT Received On 02/01/22   -TW      PT Goal Re-Cert Due Date 02/08/22  -TW            User Key  (r) = Recorded By, (t) = Taken By, (c) = Cosigned By    Initials Name Provider Type    Lenora Sánchez PT Physical Therapist              Therapy Charges for Today     Code Description Service Date Service Provider Modifiers Qty    [id number] HC PT EVAL LOW COMPLEXITY 3 2/1/2022 Lenora Blake PT GP 1          PT G-Codes  Outcome Measure Options: AM-PAC 6 Clicks Basic Mobility (PT)  AM-PAC 6 Clicks Score (PT): 17    Lenora Blake PT  2/1/2022

## 2022-02-02 ENCOUNTER — TELEPHONE (OUTPATIENT)
Dept: NEUROLOGY | Facility: CLINIC | Age: 27
End: 2022-02-02

## 2022-02-02 ENCOUNTER — APPOINTMENT (OUTPATIENT)
Dept: MRI IMAGING | Facility: HOSPITAL | Age: 27
End: 2022-02-02

## 2022-02-02 VITALS
WEIGHT: 274.47 LBS | HEART RATE: 88 BPM | TEMPERATURE: 97.8 F | OXYGEN SATURATION: 97 % | SYSTOLIC BLOOD PRESSURE: 111 MMHG | HEIGHT: 68 IN | BODY MASS INDEX: 41.6 KG/M2 | DIASTOLIC BLOOD PRESSURE: 71 MMHG | RESPIRATION RATE: 20 BRPM

## 2022-02-02 LAB
ANION GAP SERPL CALCULATED.3IONS-SCNC: 6.2 MMOL/L (ref 5–15)
BASOPHILS # BLD AUTO: 0.05 10*3/MM3 (ref 0–0.2)
BASOPHILS NFR BLD AUTO: 1 % (ref 0–1.5)
BUN SERPL-MCNC: 2 MG/DL (ref 6–20)
BUN/CREAT SERPL: 4.2 (ref 7–25)
CALCIUM SPEC-SCNC: 8.6 MG/DL (ref 8.6–10.5)
CHLORIDE SERPL-SCNC: 106 MMOL/L (ref 98–107)
CO2 SERPL-SCNC: 28.8 MMOL/L (ref 22–29)
CREAT SERPL-MCNC: 0.48 MG/DL (ref 0.76–1.27)
DEPRECATED RDW RBC AUTO: 44.5 FL (ref 37–54)
EOSINOPHIL # BLD AUTO: 0.08 10*3/MM3 (ref 0–0.4)
EOSINOPHIL NFR BLD AUTO: 1.7 % (ref 0.3–6.2)
ERYTHROCYTE [DISTWIDTH] IN BLOOD BY AUTOMATED COUNT: 14.3 % (ref 12.3–15.4)
GFR SERPL CREATININE-BSD FRML MDRD: >150 ML/MIN/1.73
GLUCOSE SERPL-MCNC: 108 MG/DL (ref 65–99)
HCT VFR BLD AUTO: 31.1 % (ref 37.5–51)
HGB BLD-MCNC: 10.4 G/DL (ref 13–17.7)
IMM GRANULOCYTES # BLD AUTO: 0.01 10*3/MM3 (ref 0–0.05)
IMM GRANULOCYTES NFR BLD AUTO: 0.2 % (ref 0–0.5)
LYMPHOCYTES # BLD AUTO: 0.95 10*3/MM3 (ref 0.7–3.1)
LYMPHOCYTES NFR BLD AUTO: 19.6 % (ref 19.6–45.3)
MAGNESIUM SERPL-MCNC: 2 MG/DL (ref 1.6–2.6)
MCH RBC QN AUTO: 29.1 PG (ref 26.6–33)
MCHC RBC AUTO-ENTMCNC: 33.4 G/DL (ref 31.5–35.7)
MCV RBC AUTO: 86.9 FL (ref 79–97)
MONOCYTES # BLD AUTO: 0.51 10*3/MM3 (ref 0.1–0.9)
MONOCYTES NFR BLD AUTO: 10.5 % (ref 5–12)
NEUTROPHILS NFR BLD AUTO: 3.24 10*3/MM3 (ref 1.7–7)
NEUTROPHILS NFR BLD AUTO: 67 % (ref 42.7–76)
NRBC BLD AUTO-RTO: 0 /100 WBC (ref 0–0.2)
PLATELET # BLD AUTO: 185 10*3/MM3 (ref 140–450)
PMV BLD AUTO: 10.1 FL (ref 6–12)
POTASSIUM SERPL-SCNC: 3.4 MMOL/L (ref 3.5–5.2)
RBC # BLD AUTO: 3.58 10*6/MM3 (ref 4.14–5.8)
SODIUM SERPL-SCNC: 141 MMOL/L (ref 136–145)
WBC NRBC COR # BLD: 4.84 10*3/MM3 (ref 3.4–10.8)

## 2022-02-02 PROCEDURE — G0378 HOSPITAL OBSERVATION PER HR: HCPCS

## 2022-02-02 PROCEDURE — 97116 GAIT TRAINING THERAPY: CPT

## 2022-02-02 PROCEDURE — 99024 POSTOP FOLLOW-UP VISIT: CPT | Performed by: SURGERY

## 2022-02-02 PROCEDURE — 25010000002 METOCLOPRAMIDE PER 10 MG: Performed by: SURGERY

## 2022-02-02 PROCEDURE — 85025 COMPLETE CBC W/AUTO DIFF WBC: CPT | Performed by: STUDENT IN AN ORGANIZED HEALTH CARE EDUCATION/TRAINING PROGRAM

## 2022-02-02 PROCEDURE — 0 POTASSIUM CHLORIDE 10 MEQ/100ML SOLUTION: Performed by: STUDENT IN AN ORGANIZED HEALTH CARE EDUCATION/TRAINING PROGRAM

## 2022-02-02 PROCEDURE — 25010000002 THIAMINE PER 100 MG: Performed by: INTERNAL MEDICINE

## 2022-02-02 PROCEDURE — 99204 OFFICE O/P NEW MOD 45 MIN: CPT | Performed by: PSYCHIATRY & NEUROLOGY

## 2022-02-02 PROCEDURE — 80048 BASIC METABOLIC PNL TOTAL CA: CPT | Performed by: STUDENT IN AN ORGANIZED HEALTH CARE EDUCATION/TRAINING PROGRAM

## 2022-02-02 PROCEDURE — A9577 INJ MULTIHANCE: HCPCS | Performed by: STUDENT IN AN ORGANIZED HEALTH CARE EDUCATION/TRAINING PROGRAM

## 2022-02-02 PROCEDURE — 99217 PR OBSERVATION CARE DISCHARGE MANAGEMENT: CPT | Performed by: STUDENT IN AN ORGANIZED HEALTH CARE EDUCATION/TRAINING PROGRAM

## 2022-02-02 PROCEDURE — 83735 ASSAY OF MAGNESIUM: CPT | Performed by: STUDENT IN AN ORGANIZED HEALTH CARE EDUCATION/TRAINING PROGRAM

## 2022-02-02 PROCEDURE — 94799 UNLISTED PULMONARY SVC/PX: CPT

## 2022-02-02 PROCEDURE — 70552 MRI BRAIN STEM W/DYE: CPT

## 2022-02-02 PROCEDURE — 0 GADOBENATE DIMEGLUMINE 529 MG/ML SOLUTION: Performed by: STUDENT IN AN ORGANIZED HEALTH CARE EDUCATION/TRAINING PROGRAM

## 2022-02-02 RX ORDER — MECLIZINE HYDROCHLORIDE 25 MG/1
25 TABLET ORAL 3 TIMES DAILY PRN
Qty: 30 TABLET | Refills: 0 | Status: SHIPPED | OUTPATIENT
Start: 2022-02-02

## 2022-02-02 RX ORDER — POTASSIUM CHLORIDE 7.45 MG/ML
10 INJECTION INTRAVENOUS
Status: COMPLETED | OUTPATIENT
Start: 2022-02-02 | End: 2022-02-02

## 2022-02-02 RX ADMIN — FAMOTIDINE 20 MG: 10 INJECTION INTRAVENOUS at 08:33

## 2022-02-02 RX ADMIN — POTASSIUM CHLORIDE 10 MEQ: 10 INJECTION, SOLUTION INTRAVENOUS at 08:33

## 2022-02-02 RX ADMIN — POTASSIUM CHLORIDE 10 MEQ: 10 INJECTION, SOLUTION INTRAVENOUS at 12:02

## 2022-02-02 RX ADMIN — THIAMINE HYDROCHLORIDE 500 MG: 100 INJECTION, SOLUTION INTRAMUSCULAR; INTRAVENOUS at 08:40

## 2022-02-02 RX ADMIN — POTASSIUM CHLORIDE 10 MEQ: 7.46 INJECTION, SOLUTION INTRAVENOUS at 00:30

## 2022-02-02 RX ADMIN — METOCLOPRAMIDE 10 MG: 5 INJECTION, SOLUTION INTRAMUSCULAR; INTRAVENOUS at 06:40

## 2022-02-02 RX ADMIN — POTASSIUM CHLORIDE, DEXTROSE MONOHYDRATE AND SODIUM CHLORIDE 125 ML/HR: 150; 5; 900 INJECTION, SOLUTION INTRAVENOUS at 06:40

## 2022-02-02 RX ADMIN — POTASSIUM CHLORIDE 10 MEQ: 10 INJECTION, SOLUTION INTRAVENOUS at 10:04

## 2022-02-02 RX ADMIN — METOCLOPRAMIDE 10 MG: 5 INJECTION, SOLUTION INTRAMUSCULAR; INTRAVENOUS at 12:02

## 2022-02-02 RX ADMIN — GADOBENATE DIMEGLUMINE 25 ML: 529 INJECTION, SOLUTION INTRAVENOUS at 14:29

## 2022-02-02 RX ADMIN — POTASSIUM CHLORIDE 10 MEQ: 10 INJECTION, SOLUTION INTRAVENOUS at 11:18

## 2022-02-02 NOTE — DISCHARGE PLACEMENT REQUEST
"Walker order. Please contact [phone] with any questions.     Thanks!  Sukhwinder Taylor (27 y.o. Male)             Date of Birth Social Security Number Address Home Phone MRN    1995  [address] [address] [phone] [id number]    Buddhist Marital Status             None Single       Admission Date Admission Type Admitting Provider Attending Provider Department, Room/Bed    22 Emergency Sonya Christopher MD Clayton, Taylor C, DO [facility] COVID19 SURGE ICU ON PREOP PHASE II, P06/    Discharge Date Discharge Disposition Discharge Destination           Home or Self Care              Attending Provider: Carolina Rich DO    Allergies: Penicillins    Isolation: None   Infection: None   Code Status: CPR   Advance Care Planning Activity    Ht: 172.7 cm (68\")   Wt: 124 kg (274 lb 7.6 oz)    Admission Cmt: None   Principal Problem: None                Active Insurance as of 2022     Primary Coverage     Payor Plan Insurance Group Employer/Plan Group    [employer] [place] [employer] [place]ICA[place]      Payor Plan Address Payor Plan Phone Number Payor Plan Fax Number Effective Dates    [address] [address] [phone]  2022 - None Entered    [facility] [id number]       Subscriber Name Subscriber Birth Date Member ID       SUKHWINDER TAYLOR 1995 [id number]                 Emergency Contacts      (Rel.) Home Phone Work Phone Mobile Phone    DAHIANA BUSTAMANTE (Mother) [phone] -- --          [facility] COVID19 SURGE ICU ON PREOP PHASE II  [address] [facility] [address]  Dept. Phone:  [phone]  Dept. Fax:   Date Ordered: 2022         Patient:  Sukhwinder Taylor MRN:  [id number]   [address] [address] :  1995  SSN:    Phone: [phone] Sex:  M     Weight: 124 kg (274 lb 7.6 oz)         Ht Readings from Last 1 Encounters:   22 172.7 cm (68\")               Bert               (Order ID: " [id number])    Diagnosis:  Nystagmus due to benign paroxysmal positional vertigo (H81.10 [ICD-10-CM] 386.11 [ICD-9-CM])  Dehydration (E86.0 [ICD-10-CM] 276.51 [ICD-9-CM])   Quantity:  1     Equipment:  Walker Folding with Wheels  Length of Need (99 Months = Lifetime): 99 Months = Lifetime        Authorizing Provider's Phone: [phone]  Authorizing Provider:Carolina Rich DO  Authorizing Provider's NPI: [id number]  Order Entered By: Carolina Rich DO 2/2/2022  3:42 PM     Electronically signed by: Carolina Rich DO 2/2/2022  3:42 PM

## 2022-02-02 NOTE — CASE MANAGEMENT/SOCIAL WORK
Discharge Planning Assessment  [facility]     Patient Name: Milton Taylor  MRN: [id number]  Today's Date: 2/2/2022    Admit Date: 1/28/2022    Discharge Needs Assessment         Discharge Plan    Row Name 02/02/22 0530   Plan   Plan Comments SW attempted telephone contact with pt to complete discharge planning. SW has attempted multiple times and have not been able to reach pt. SW left another message. SW to follow up with discharge planning at a later time.    15:51 EST  SW received a call from pt's nurse stating that pt is requesting a walker. SW received walker order from Dr. Rich. Order sent to [facility]. SW contacted [facility] and informed them of order. Pt to  from their office as he is being discharged now. SW to continue to follow and assist as needed.          Continued Care and Services - Admitted Since 1/28/2022    Coordination has not been started for this encounter.       Expected Discharge Date and Time     Expected Discharge Date Expected Discharge Time    Feb 2, 2022          Demographic Summary    No documentation.                Functional Status    No documentation.                Psychosocial    No documentation.                Abuse/Neglect    No documentation.                Legal    No documentation.                Substance Abuse    No documentation.                Patient Forms    No documentation.                   ROSA Soni

## 2022-02-02 NOTE — CONSULTS
"Stroke Consult Note    Patient Name: Milton Taylor   MRN: [id number]  Age: 27 y.o.  Sex: male  : 1995    Primary Care Physician: Lakeshia Yap MD  Referring Physician:  No ref. provider found        Chief Complaint/Reason for Consultation:   Dizziness    Subjective:  History obtained from patient and from medical staff and from family members at bedside.  No previous history of recent stroke or transient ischemic attack, no history of seizures or passing out. No history of focal weakness suggesting relapsing remitting MS.  Patient taken no anticoagulation therapy or antiplatelet therapy.  No history of head trauma or meningitis or strong family history of epilepsy.  No strong family history of aneurysm. No recent change in patient daily routine.  No recent new medication her symptoms started unprovoked.  Patient experience ataxia, horizontal and vertical nystagmus. For 48 hours. No other focal weakness.        Past Medical History:   Diagnosis Date   • Anxiety    • Chest pain     Intermittent chest pain for the last 7 to 8 years. Symptoms last for about a day at a time. Has radiated down his left arm in the past. Nothing known to relieve the pain, just \"goes away on its own\"   • Depression    • GERD (gastroesophageal reflux disease)    • PONV (postoperative nausea and vomiting)    • Scoliosis    • Sleep apnea     does not currently have a CPAP machine, has had one in the past   • Wears glasses      Past Surgical History:   Procedure Laterality Date   • CHOLECYSTECTOMY WITH INTRAOPERATIVE CHOLANGIOGRAM N/A 2022    Procedure: CHOLECYSTECTOMY LAPAROSCOPIC INTRAOPERATIVE CHOLANGIOGRAPHY;  Surgeon: Nj De Jesus MD;  Location: [facility];  Service: General;  Laterality: N/A;   • DIAGNOSTIC LAPAROSCOPY N/A 2022    Procedure: DIAGNOSTIC LAPAROSCOPY evacuation of hematoma and control bleeding of liver bed;  Surgeon: Sonya Christopher MD;  Location: [facility];  Service: General;  Laterality: N/A; "   • EAR TUBES     • WISDOM TOOTH EXTRACTION      x4     Family History   Problem Relation Age of Onset   • Hyperlipidemia Father    • Heart disease Father    • Cancer Maternal Grandfather         pancreatic     Social History     Socioeconomic History   • Marital status: Single   Tobacco Use   • Smoking status: Former Smoker     Quit date:      Years since quittin.0   • Smokeless tobacco: Never Used   Vaping Use   • Vaping Use: Former   Substance and Sexual Activity   • Alcohol use: Never   • Drug use: Never   • Sexual activity: Defer       Allergies   Allergen Reactions   • Penicillins Rash     Prior to Admission medications    Medication Sig Start Date End Date Taking? Authorizing Provider   famotidine (Pepcid) 20 MG tablet Take 20 mg by mouth Daily As Needed.   Yes Nate Foster MD   HYDROcodone-acetaminophen (NORCO) 7.5-325 MG per tablet Take 1 tablet by mouth Every 6 (Six) Hours As Needed for Moderate Pain 22  Yes Nj De Jesus MD   nystatin (MYCOSTATIN) 100,000 unit/mL suspension Take 200,000 Units by mouth 4 (Four) Times a Day. 1/10/22  Yes Nate Foster MD   ondansetron (ZOFRAN) 4 MG tablet Take 4 mg by mouth Every 8 (Eight) Hours As Needed for Nausea or Vomiting.   Yes Nate Foster MD   potassium chloride (K-DUR,KLOR-CON) 20 MEQ CR tablet TAKE ONE TABLET BY MOUTH TWICE DAILY WITH A FULL GLASS OF WATER  Patient not taking: Reported on 2022   Nate Foster MD   promethazine (PHENERGAN) 25 MG tablet Take 25 mg by mouth Every 6 (Six) Hours As Needed for Nausea or Vomiting.    Nate Foster MD       Objective:    Temp:  [96.8 °F (36 °C)-98.6 °F (37 °C)] 97.8 °F (36.6 °C)  Heart Rate:  [77-98] 78  Resp:  [18-21] 18  BP: (104-120)/(65-77) 120/76    Neuro Exam:    AAOX3 follow multi step command, Track objects all directions. No visual filed cut . Pupils equal and reactive. No clear facial droop. No nystagmus. Normal language fluency, repetition  and comprehension. Power: Move all extremities without limitation. No drift, leg lags.  Did good finger nose finger. Normal sensory examination. Didn't evaluate gait at this time.  Time: 12:43 EST  Person Administering Scale: Bashar A Mohsen, MD    1a  Level of consciousness: 0=alert; keenly responsive   1b. LOC questions:  0=Performs both tasks correctly   1c. LOC commands: 0=Performs both tasks correctly   2.  Best Gaze: 0=normal   3.  Visual: 0=No visual loss   4. Facial Palsy: 0=Normal symmetric movement   5a.  Motor left arm: 0=No drift, limb holds 90 (or 45) degrees for full 10 seconds   5b.  Motor right arm: 0=No drift, limb holds 90 (or 45) degrees for full 10 seconds   6a. motor left le=No drift, limb holds 90 (or 45) degrees for full 10 seconds   6b  Motor right le=No drift, limb holds 90 (or 45) degrees for full 10 seconds   7. Limb Ataxia: 0=Absent   8.  Sensory: 0=Normal; no sensory loss   9. Best Language:  0=No aphasia, normal   10. Dysarthria: 0=Normal   11. Extinction and Inattention: 0=No abnormality    Total:   0       This was an audio and video enabled telemedicine encounter.           Hospital Meds:  Scheduled- famotidine, 20 mg, Intravenous, BID  metoclopramide, 10 mg, Intravenous, 4x Daily AC & at Bedtime  potassium chloride, 10 mEq, Intravenous, Q1H  sodium chloride, 3 mL, Intravenous, Q12H  thiamine (VITAMIN B1) IVPB, 500 mg, Intravenous, TID      Infusions- dextrose 5 % and sodium chloride 0.9 % with KCl 20 mEq, 125 mL/hr, Last Rate: 125 mL/hr (22 1119)       PRNs- HYDROcodone-acetaminophen  •  HYDROcodone-acetaminophen  •  magnesium sulfate **OR** magnesium sulfate **OR** magnesium sulfate  •  meclizine  •  ondansetron **OR** ondansetron  •  potassium chloride **OR** potassium chloride **OR** potassium chloride  •  promethazine **OR** promethazine  •  [COMPLETED] Insert peripheral IV **AND** sodium chloride  •  sodium chloride    Results Reviewed:  I have personally  reviewed current lab, radiology, and data and agree with results.    Radiology Results    Results for orders placed during the hospital encounter of 01/28/22    CT Head Without Contrast    Narrative  PROCEDURE: CT HEAD WO CONTRAST-    HISTORY: Neuro deficit, acute, stroke suspected; [id number], not  elsewhere classified    COMPARISON:  None .    TECHNIQUE: Multiple axial CT images were performed from the foramen  magnum to the vertex without enhancement.    FINDINGS: There is no CT evidence of hemorrhage. There is no mass, mass  effect or midline shift.  There is no hydrocephalus. The paranasal  sinuses are clear. Bone windows reveal no acute osseous abnormalities.    Impression  No acute intracranial process. If clinical symptoms persist  consider an MRI.        739.55 mGy.cm      This study was performed with techniques to keep radiation doses as low  as reasonably achievable (ALARA). Individualized dose reduction  techniques using automated exposure control or adjustment of mA and/or  kV according to the patient size were employed.    This report was finalized on 1/31/2022 4:27 PM by Michelle Yo M.D..    Results for orders placed during the hospital encounter of 01/28/22    MRI Brain Without Contrast    Narrative  PROCEDURE: MRI BRAIN WO CONTRAST-    HISTORY: Dizziness, non-specific    PROCEDURE: Multiplanar multisequence imaging of the brain was performed  without the use of intravenous contrast.    COMPARISON: None.    FINDINGS: There are no significant white matter abnormalities. There is  no mass, mass effect or midline shift. There is no hydrocephalus. There  are no areas of restricted diffusion.    The midbrain, jose, cerebellum and craniocervical junction are  unremarkable. The sella and pituitary gland are within normal limits.  The major intracranial vasculature demonstrates the expected flow  related signal. The paranasal sinuses are clear.    Impression  Unremarkable MRI of the  brain.        This report was finalized on 2/1/2022 2:35 PM by Michelle Yo M.D..      Lab Results  No results found for: CHOL, HDL, LDLDIRECT, LDL, TRIG  Results from last 7 days   Lab Units 02/02/22  0605 02/01/22  0746   WBC 10*3/mm3 4.84 4.65   HEMOGLOBIN g/dL 10.4* 10.8*   MCV fL 86.9 85.1   PLATELETS 10*3/mm3 185 193   NEUTROPHIL % % 67.0 63.1   LYMPHOCYTE % % 19.6 22.4   EOSINOPHIL % % 1.7 0.9   IMM GRAN % % 0.2 0.2   NEUTROS ABS 10*3/mm3 3.24 2.94   LYMPHS ABS 10*3/mm3 0.95 1.04   EOS ABS 10*3/mm3 0.08 0.04   IMMATURE GRANS (ABS) 10*3/mm3 0.01 0.01     Results from last 7 days   Lab Units 02/02/22  0605   SODIUM mmol/L 141   POTASSIUM mmol/L 3.4*   CHLORIDE mmol/L 106   CO2 mmol/L 28.8   BUN mg/dL 2*   CREATININE mg/dL 0.48*   GLUCOSE mg/dL 108*   CALCIUM mg/dL 8.6       Assessment:  New onset nystagmus, rule out MS  Rule out polyneuropathy.      Plan:  MRI of the brain noted. Sent patient need MRI of the brain with contrast.  EMG nerve conduction study as outpatient.  EEG as outpatient.  [facility] ophthalmologist as outpatient.  ENT evaluation as outpatient.  Physical therapy.        Time: 12:43 EST  Person Administering Scale: Bashar A Mohsen, MD        This was an audio and video enabled telemedicine encounter.       Electronically signed by Bashar A Mohsen, MD, 02/02/22, 12:43 PM EST.

## 2022-02-02 NOTE — PLAN OF CARE
Goal Outcome Evaluation:  Plan of Care Reviewed With: patient        Progress: no change  Outcome Summary: VSS, rested well, pain controlled, ambulated to BR with walker and x1 assist, encouraged IS

## 2022-02-02 NOTE — THERAPY TREATMENT NOTE
"Patient Name: Milton Taylor  : 1995    MRN: [id number]                              Today's Date: 2022       Admit Date: 2022    Visit Dx:     ICD-10-CM ICD-9-CM   1. Intraabdominal hemorrhage  R58 459.0     Patient Active Problem List   Diagnosis   • Biliary dyskinesia   • Nausea   • Right upper quadrant abdominal pain   • Dehydration   • Intraabdominal hemorrhage   • Nystagmus due to benign paroxysmal positional vertigo     Past Medical History:   Diagnosis Date   • Anxiety    • Chest pain     Intermittent chest pain for the last 7 to 8 years. Symptoms last for about a day at a time. Has radiated down his left arm in the past. Nothing known to relieve the pain, just \"goes away on its own\"   • Depression    • GERD (gastroesophageal reflux disease)    • PONV (postoperative nausea and vomiting)    • Scoliosis    • Sleep apnea     does not currently have a CPAP machine, has had one in the past   • Wears glasses      Past Surgical History:   Procedure Laterality Date   • CHOLECYSTECTOMY WITH INTRAOPERATIVE CHOLANGIOGRAM N/A 2022    Procedure: CHOLECYSTECTOMY LAPAROSCOPIC INTRAOPERATIVE CHOLANGIOGRAPHY;  Surgeon: Nj De Jesus MD;  Location: [facility];  Service: General;  Laterality: N/A;   • DIAGNOSTIC LAPAROSCOPY N/A 2022    Procedure: DIAGNOSTIC LAPAROSCOPY evacuation of hematoma and control bleeding of liver bed;  Surgeon: Sonya Christopher MD;  Location: [facility];  Service: General;  Laterality: N/A;   • EAR TUBES     • WISDOM TOOTH EXTRACTION      x4      General Information     Row Name 22 1203          Physical Therapy Time and Intention    Document Type therapy note (daily note)  -MS     Mode of Treatment physical therapy  -MS     Row Name 22 1203          General Information    Patient Profile Reviewed yes  -MS     Prior Level of Function independent:; all household mobility; community mobility  -MS     Existing Precautions/Restrictions fall; oxygen therapy device " and L/min  -MS     Row Name 02/02/22 1203          Cognition    Orientation Status (Cognition) oriented x 4  -MS           User Key  (r) = Recorded By, (t) = Taken By, (c) = Cosigned By    Initials Name Provider Type    Cory Zimmerman, PT Physical Therapist               Mobility     Row Name 02/02/22 1232          Bed Mobility    Bed Mobility sit-supine; supine-sit  -MS     Supine-Sit Reeves (Bed Mobility) standby assist  -MS     Sit-Supine Reeves (Bed Mobility) modified independence  -MS     Assistive Device (Bed Mobility) head of bed elevated  -MS     Row Name 02/02/22 1232          Sit-Stand Transfer    Sit-Stand Reeves (Transfers) standby assist; verbal cues  -MS     Assistive Device (Sit-Stand Transfers) walker, front-wheeled  -MS     Row Name 02/02/22 1232          Gait/Stairs (Locomotion)    Reeves Level (Gait) contact guard; minimum assist (75% patient effort)  -MS     Assistive Device (Gait) walker, front-wheeled  -MS     Distance in Feet (Gait) 180ft  -MS     Deviations/Abnormal Patterns (Gait) base of support, wide; stride length decreased; festinating/shuffling  -MS     Comment (Gait/Stairs) Pt did have impaired balance due to dizziness related to Pt's rhythmic eye movements  -MS           User Key  (r) = Recorded By, (t) = Taken By, (c) = Cosigned By    Initials Name Provider Type    Cory Zimmerman, VENKATA Physical Therapist               Obj/Interventions     Row Name 02/02/22 1233          Balance    Static Standing Balance mild impairment; supported  -MS     Dynamic Standing Balance mild impairment; supported  -MS           User Key  (r) = Recorded By, (t) = Taken By, (c) = Cosigned By    Initials Name Provider Type    Cory Zimmerman PT Physical Therapist               Goals/Plan    No documentation.                Clinical Impression     Row Name 02/02/22 1238          Pain Scale: Numbers Pre/Post-Treatment    Pretreatment Pain Rating 0/10 - no pain  -MS      Posttreatment Pain Rating 0/10 - no pain  -MS     Row Name 02/02/22 1238          Plan of Care Review    Plan of Care Reviewed With patient  -MS     Progress improving  -MS     Outcome Summary Pt participated in PTx this date. Pt was able to ambulate 200ft with Rwx and CGA. Pt did remort mild balance deficits due to dizziness with ambulation. Pt presents with a rhythmic vertical eye movement pattern which impacts Pt's balance. Pt was able to increase gait distance this date. Pt progressing, cont POC.  -MS     Row Name 02/02/22 1238          Therapy Assessment/Plan (PT)    Criteria for Skilled Interventions Met (PT) yes; meets criteria  -MS     Row Name 02/02/22 1238          Vital Signs    Pre SpO2 (%) 100  -MS     O2 Delivery Pre Treatment supplemental O2  -MS     O2 Delivery Intra Treatment room air  -MS     O2 Delivery Post Treatment supplemental O2  -MS     Pre Patient Position Supine  -MS     Intra Patient Position Standing  -MS     Post Patient Position Supine  -MS     Row Name 02/02/22 1238          Positioning and Restraints    Pre-Treatment Position in bed  -MS     Post Treatment Position bed  -MS     In Bed fowlers; encouraged to call for assist; call light within reach  -MS           User Key  (r) = Recorded By, (t) = Taken By, (c) = Cosigned By    Initials Name Provider Type    MS Cory Caballero, PT Physical Therapist               Outcome Measures     Row Name 02/02/22 1244          How much help from another person do you currently need...    Turning from your back to your side while in flat bed without using bedrails? 3  -MS     Moving from lying on back to sitting on the side of a flat bed without bedrails? 3  -MS     Moving to and from a bed to a chair (including a wheelchair)? 3  -MS     Standing up from a chair using your arms (e.g., wheelchair, bedside chair)? 3  -MS     Climbing 3-5 steps with a railing? 2  -MS     To walk in hospital room? 3  -MS     AM-PAC 6 Clicks Score (PT) 17  -MS            User Key  (r) = Recorded By, (t) = Taken By, (c) = Cosigned By    Initials Name Provider Type    MS Cory Caballero, VENKATA Physical Therapist                             Physical Therapy Education                 Title: PT OT SLP Therapies (Done)     Topic: Physical Therapy (Done)     Point: Mobility training (Done)     Learning Progress Summary           Patient Acceptance, E, VU by MS at 2/2/2022 1244    Comment: importance of mobility    Eager, E, VU by LR at 2/1/2022 1419    Acceptance, E,D, VU,NR by TW at 2/1/2022 1135    Comment: Pt educaiton for focusing vision during gait to assist with mobility                   Point: Body mechanics (Done)     Learning Progress Summary           Patient Acceptance, E, VU by MS at 2/2/2022 1244    Comment: importance of mobility    Eager, E, VU by LR at 2/1/2022 1419                   Point: Precautions (Done)     Learning Progress Summary           Patient Eager, E, VU by LR at 2/1/2022 1419    Acceptance, E,D, VU,NR by TW at 2/1/2022 1135    Comment: Pt educaiton for focusing vision during gait to assist with mobility                               User Key     Initials Effective Dates Name Provider Type Discipline    LR 12/29/21 -  Radha Guadarrama RN Registered Nurse Nurse    TW 06/16/21 -  Lenora Blake PT Physical Therapist PT    MS 06/16/21 -  Cory Caballero PT Physical Therapist PT              PT Recommendation and Plan     Plan of Care Reviewed With: patient  Progress: improving  Outcome Summary: Pt participated in PTx this date. Pt was able to ambulate 200ft with Rwx and CGA. Pt did remort mild balance deficits due to dizziness with ambulation. Pt presents with a rhythmic vertical eye movement pattern which impacts Pt's balance. Pt was able to increase gait distance this date. Pt progressing, cont POC.     Time Calculation:    PT Charges     Row Name 02/02/22 1245             Time Calculation    Start Time 1110  -MS      Stop Time 1125  -MS      Time  Calculation (min) 15 min  -MS      PT Received On 02/02/22  -MS              Timed Charges    94347 - Gait Training Minutes  15  -MS              Total Minutes    Timed Charges Total Minutes 15  -MS       Total Minutes 15  -MS            User Key  (r) = Recorded By, (t) = Taken By, (c) = Cosigned By    Initials Name Provider Type    Cory Zimmerman PT Physical Therapist              Therapy Charges for Today     Code Description Service Date Service Provider Modifiers Qty    34168944041 HC GAIT TRAINING EA 15 MIN 2/2/2022 Cory Caballero PT GP 1          PT G-Codes  Outcome Measure Options: AM-PAC 6 Clicks Basic Mobility (PT)  AM-PAC 6 Clicks Score (PT): 17    Cory Caballero PT  2/2/2022

## 2022-02-02 NOTE — DISCHARGE SUMMARY
[facility]   DISCHARGE SUMMARY      Name:  Milton Taylor   Age:  27 y.o.  Sex:  male  :  1995  MRN:  [id number]   Visit Number:  [id number]    Admission Date:  2022  Date of Discharge:  2022  Primary Care Physician:  Lakeshia Yap MD    Important issues to note:    - Will need to follow up with neurology as an outpatient  - Follow up neuro ophthalmology as outpatient  - Follow up PCP in 1 week  - Follow up general surgery in 2 weeks  - EMG nerve conduction study as an outpatient  - EEG as an outpatient  - May need ENT evaluation as an outpatient  - Walker ordered for patient at time of discharge as the patient has a mobility limitation that significantly impairs his ability to participate in one or more mobility-related activities of daily living in the home and the patient is able to safely use the walker and the functional mobility deficit can be sufficiently resolved by use of a walker.     Discharge Diagnoses:     1. Questionable Vertical Nystagmus, rule out MS  2. Benign Paroxysmal Positional Vertigo  3. Intra-Abdominal Hemorrhage s/p Laparoscopic Evacuation of Hematoma  4. S/p Laparoscopic Cholecystectomy 22  5. Hypokalemia, Improved  6. Intractable Nausea, Vomiting, Improved      Problem List:     Active Hospital Problems    Diagnosis  POA   • Nystagmus due to benign paroxysmal positional vertigo [H81.10]  Yes   • Intraabdominal hemorrhage [R58]  Yes      Resolved Hospital Problems   No resolved problems to display.     Presenting Problem:    Chief Complaint   Patient presents with   • Post-op Problem      Consults:     Consulting Physician(s)  Chat With All Active Members    Provider Relationship Specialty    Mohsen, Bashar A, MD  Consulting Physician Neurology        Procedures Performed:    Procedure(s):  DIAGNOSTIC LAPAROSCOPY evacuation of hematoma and control bleeding of liver bed    History of presenting illness/Hospital Course:    27 year  "old male with no significant PMH. Presented to ER for intractable Nausea, vomiting,and ABD pain. Patient did have recent lacey on 1/26. He states that since he has been home, he had not been able to eat or drink with worsening ABD pain. Patient was noted to have significant blood in the liver and hepatic fossa consistent with postop bleed. Patient was also noted to have a drop in hemoglobin from 17 to 13. Subsequently, patient underwent diagnostic lap with evacuation of hematoma with drain placed.Patient continues to have intractable N/V with ABD pain mostly in RUQ. Hgb remains stable at 10.9 this am. The Hospitalist service was consulted given the patients \"new eye problem\". Patient states that he started having involuntary eye twitching early 1/31/22. Patient states he is unable to focus with his eyes. He did have Dilaudid this morning for which the patient states that he was not able to \"hold my eyes open\". Dilaudid was then discontinued. Patient then refused all of his oral pain medications because he does not like being overly sedated. He states that when he opens his eyes, vision is blurred and room appears to be \"spinning\". He does wear prescription lenses, however, states vision is usually not this bad. He denies history of vertigo. He denies unilateral weakness. Patient states that he can't walk because of his balance issues as well, which has been an issue since after his first surgery 5 days ago.     Of note, patient states that he does have history of Opiate Abuse in the past with Percocet. He denies IV drug abuse. He states that he has maintained his sobriety for the past couple of years.     CT head obtained on 1/31/22 which revealed no acute intracranial process and recommended MRI if symptoms persist. MRI without contrast performed and revealed no acute intracranial pathology. Neurology was then consulted and evaluated the patient. Dr. Mohsen recommended obtaining MRI with contrast and then " discharging the patient for him to follow up with neurology as an outpatient, outpatient neuro opthalmology, as well as ENT as an outpatient. Neurology recommended obtaining EMG nerve conduction study as an outpatient as well as an EEG as an outpatient. After hospitalist personally discussed the case with the neurologist, it was recommended that the patient be discharged home for outpatient followup, with no transfer to higher level of care warranted. Patient medically stable at time of discharge.    Will need to follow up with PCP in one week, general surgery in 2 weeks, neurology in 2 weeks, neuro opthalmology in 2 weeks, and ENT in 2-3 weeks.    Vital Signs:    Temp:  [96.8 °F (36 °C)-98.6 °F (37 °C)] 97.8 °F (36.6 °C)  Heart Rate:  [78-98] 78  Resp:  [18-21] 18  BP: (104-120)/(65-77) 120/76    Physical Exam:    General Appearance:  Alert and cooperative. Lying in bed, in NAD.   Head:  Atraumatic and normocephalic.   Eyes: PERRL. EOMI. Bilateral eyes with persistent up and down repetitive movement of the eyes, exaggerated when physician is speaking with patient, seemingly absent when patient is viewed from outside of the room. Tracks around the room.       Throat: No oral lesions, no thrush, oral mucosa moist.   Neck: Supple, trachea midline, no thyromegaly.       Lungs:   Breath sounds heard bilaterally equally.  No crackles or wheezing. No Pleural rub or bronchial breathing.   Heart:  Normal S1 and S2, no murmur, no gallop, no rub. No JVD.   Abdomen:   Incision sites dry without evidence of bleeding, erythema, or drainage. Normal bowel sounds, no masses, no organomegaly. Soft, nontender, nondistended, no rebound tenderness.   Extremities: Supple, no edema, no cyanosis, no clubbing.   Pulses: Pulses palpable bilaterally.   Skin: No bleeding or rash.   Neurologic: Alert and oriented x 3. No facial asymmetry. Moves all four limbs. No tremors.     Pertinent Lab Results:     Results from last 7 days   Lab Units  02/02/22  0605 02/01/22  1754 02/01/22  0746 01/31/22  1131 01/31/22  1131 01/29/22  0536 01/29/22  0536 01/28/22  1614 01/28/22  1614   SODIUM mmol/L 141  --  141  --  137   < > 135*   < > 134*   POTASSIUM mmol/L 3.4* 3.2* 3.2*   < > 2.9*   < > 2.7*   < > 2.8*   CHLORIDE mmol/L 106  --  101  --  92*   < > 90*   < > 88*   CO2 mmol/L 28.8  --  31.2*  --  34.8*   < > 31.7*   < > 29.2*   BUN mg/dL 2*  --  4*  --  4*   < > 7   < > 9   CREATININE mg/dL 0.48*  --  0.48*  --  0.54*   < > 0.69*   < > 0.69*   CALCIUM mg/dL 8.6  --  8.5*  --  8.8   < > 8.6   < > 9.4   BILIRUBIN mg/dL  --   --   --   --  1.2  --  0.7  --  0.9   ALK PHOS U/L  --   --   --   --  108  --  70  --  71   ALT (SGPT) U/L  --   --   --   --  55*  --  23  --  24   AST (SGOT) U/L  --   --   --   --  70*  --  26  --  29   GLUCOSE mg/dL 108*  --  101*  --  107*   < > 109*   < > 106*    < > = values in this interval not displayed.     Results from last 7 days   Lab Units 02/02/22  0605 02/01/22  0746 01/31/22  0726 01/30/22  0532 01/30/22  0532   WBC 10*3/mm3 4.84 4.65  --   --  4.99   HEMOGLOBIN g/dL 10.4* 10.8* 11.7*   < > 10.9*   HEMATOCRIT % 31.1* 32.1* 34.1*   < > 31.5*   PLATELETS 10*3/mm3 185 193  --   --  178    < > = values in this interval not displayed.                     Results from last 7 days   Lab Units 01/31/22  1131   LIPASE U/L 154*               Pertinent Radiology Results:    Imaging Results (All)     Procedure Component Value Units Date/Time    MRI Brain With Contrast [[id number]] Collected: 02/02/22 1431     Updated: 02/02/22 1434    Narrative:      PROCEDURE: MRI BRAIN W CONTRAST-     HISTORY: Diplopia; A84-[id number], not elsewhere classified     PROCEDURE: Post contrast T1 images of the brain were obtained following  the administration of 15 mL MultiHance.     COMPARISON: None.     FINDINGS: There is no mass, mass effect or midline shift. There is no  hydrocephalus. There is no pathologic contrast enhancement.     The midbrain,  jose, cerebellum and craniocervical junction are  unremarkable. The sella and pituitary gland are within normal limits.  The major intracranial vasculature demonstrates the expected flow  related signal. The paranasal sinuses are clear.       Impression:      No evidence of pathologic contrast enhancement.           This report was finalized on 2/2/2022 2:32 PM by Michelle Yo M.D..    MRI Brain Without Contrast [[id number]] Collected: 02/01/22 1435     Updated: 02/01/22 1437    Narrative:      PROCEDURE: MRI BRAIN WO CONTRAST-     HISTORY: Dizziness, non-specific     PROCEDURE: Multiplanar multisequence imaging of the brain was performed  without the use of intravenous contrast.     COMPARISON: None.     FINDINGS: There are no significant white matter abnormalities. There is  no mass, mass effect or midline shift. There is no hydrocephalus. There  are no areas of restricted diffusion.     The midbrain, jose, cerebellum and craniocervical junction are  unremarkable. The sella and pituitary gland are within normal limits.  The major intracranial vasculature demonstrates the expected flow  related signal. The paranasal sinuses are clear.       Impression:      Unremarkable MRI of the brain.           This report was finalized on 2/1/2022 2:35 PM by Michelle Yo M.D..    CT Head Without Contrast [[id number]] Collected: 01/31/22 1627     Updated: 01/31/22 1629    Narrative:      PROCEDURE: CT HEAD WO CONTRAST-     HISTORY: Neuro deficit, acute, stroke suspected; [id number], not  elsewhere classified     COMPARISON:  None .     TECHNIQUE: Multiple axial CT images were performed from the foramen  magnum to the vertex without enhancement.      FINDINGS: There is no CT evidence of hemorrhage. There is no mass, mass  effect or midline shift.  There is no hydrocephalus. The paranasal  sinuses are clear. Bone windows reveal no acute osseous abnormalities.       Impression:      No acute intracranial process. If  clinical symptoms persist  consider an MRI.           739.55 mGy.cm        This study was performed with techniques to keep radiation doses as low  as reasonably achievable (ALARA). Individualized dose reduction  techniques using automated exposure control or adjustment of mA and/or  kV according to the patient size were employed.      This report was finalized on 1/31/2022 4:27 PM by Michelle Yo M.D..    CT Abdomen Pelvis With Contrast [[id number]] Collected: 01/28/22 1848     Updated: 01/28/22 1849    Narrative:      FINAL REPORT    TECHNIQUE:  Axial CT images were performed from the lung bases through the  symphysis pubis after the administration of intravenous  contrast.  This study was performed with techniques to keep  radiation doses as low as reasonably achievable (ALARA).  Individualized dose reduction techniques using automated  exposure control or adjustment of mA and/or kV according to the  patient's size were employed.    CLINICAL HISTORY:  n/v recent cholecystectomy, right sided abd pain    FINDINGS:  LOWER CHEST: The heart is normal size.  The lung bases are  clear.  ABDOMEN/PELVIS:  Liver, gallbladder and bile ducts: The  liver enhances homogeneously without suspicious focal hepatic  lesion.  There are postoperative changes of recent cystectomy.  There is some hemorrhage within the gallbladder fossa extending  into the bilateral pericolic gutters.  Adrenal glands: The  adrenal glands are morphologically unremarkable without  suspicious lesion.  Kidneys, ureter and urinary bladder: No  suspicious renal lesion.  No hydronephrosis.  Urinary bladder is  unremarkable.  Spleen: The spleen is normal size.  Pancreas: The  pancreas is grossly unremarkable.  GI systems and mesentery: No  evidence of bowel obstruction.  The appendix is visualized and  unremarkable in appearance.  No significant mesenteric  inflammation.  Lymph nodes: No definite pathologically enlarged  abdominal or pelvic lymph nodes  present.  Vessels: The aorta and  abdominal arteries are grossly patent.  The IVC and portal vein  are patent and grossly unremarkable.  Peritoneum: There is small  volume hemoperitoneum in the pelvis and small volume  pneumoperitoneum.  Pelvic viscera: No acute findings.  Body  wall: No body wall contusion. No significant body wall hernias.  Bones: No acute fracture.      Impression:      Postoperative changes of a recent cholecystectomy.  The amount  of hemorrhage in the gallbladder fossa and in the abdomen and  pelvis is somewhat more than expected in a typical postoperative  setting.  This is still likely postoperative in etiology.  No  significant active extravasation of contrast to suggest an  ongoing hemorrhage.    Authenticated by Douglas Pizano MD on 01/28/2022 06:48:59 PM          Echo:      Condition on Discharge:      Stable.    Code status during the hospital stay:    Code Status and Medical Interventions:   Ordered at: 01/28/22 2119     Code Status (Patient has no pulse and is not breathing):    CPR (Attempt to Resuscitate)     Medical Interventions (Patient has pulse or is breathing):    Full Support     Discharge Disposition:    Home or Self Care    Discharge Medications:       Discharge Medications      New Medications      Instructions Start Date   meclizine 25 MG tablet  Commonly known as: ANTIVERT   25 mg, Oral, 3 Times Daily PRN         Continue These Medications      Instructions Start Date   HYDROcodone-acetaminophen 7.5-325 MG per tablet  Commonly known as: NORCO   Take 1 tablet by mouth Every 6 (Six) Hours As Needed for Moderate Pain      nystatin 100,000 unit/mL suspension  Commonly known as: MYCOSTATIN   200,000 Units, Oral, 4 Times Daily      ondansetron 4 MG tablet  Commonly known as: ZOFRAN   4 mg, Oral, Every 8 Hours PRN      Pepcid 20 MG tablet  Generic drug: famotidine   20 mg, Oral, Daily PRN      potassium chloride 20 MEQ CR tablet  Commonly known as: K-DUR,KLOR-CON   TAKE ONE  TABLET BY MOUTH TWICE DAILY WITH A FULL GLASS OF WATER      promethazine 25 MG tablet  Commonly known as: PHENERGAN   25 mg, Oral, Every 6 Hours PRN           Discharge Diet:     As tolerated    Activity at Discharge:     As tolerated    Follow-up Appointments:     Follow-up Information     Lakeshia Yap MD Follow up in 1 week(s).    Specialty: Family Medicine  Contact information:  [address] [address]  [phone]             [facility] NEUROLOGY Follow up in 1 week(s).    Specialty: Neurology  Contact information:  [address]  [address]  [facility] [address] [phone]  Additional information:  Phone Number: [phone]      If coming from [facility] you will turn left on [address] right before the [address].                     Future Appointments   Date Time Provider Department Center   2/7/2022 10:50 AM Nj De Jesus MD [facility] (   2/15/2022  1:00 PM Nancy Jiménez APRN [facility]     Test Results Pending at Discharge:           Carolina Rich DO  02/02/22  15:01 EST    Time: I spent 47 minutes on this discharge activity which included: face-to-face encounter with the patient, reviewing the data in the system, coordination of the care with the nursing staff as well as consultants, documentation, and entering orders.     Dictated utilizing Dragon dictation.

## 2022-02-02 NOTE — PLAN OF CARE
Goal Outcome Evaluation:  Plan of Care Reviewed With: patient        Progress: improving  Outcome Summary: Pt participated in PTx this date. Pt was able to ambulate 200ft with Rwx and CGA. Pt did remort mild balance deficits due to dizziness with ambulation. Pt presents with a rhythmic vertical eye movement pattern which impacts Pt's balance. Pt was able to increase gait distance this date. Pt progressing, cont POC.

## 2022-02-02 NOTE — PROGRESS NOTES
LOS: 0 days   Patient Care Team:  Lakeshia Yap MD as PCP - General (Family Medicine)      Chief Complaint: still complains of blurred vision      Interval History:     Patient Complaints: See Above, passing flatus, passing stool    History taken from: patient    Vital Signs  Temp:  [96.8 °F (36 °C)-98.6 °F (37 °C)] 98.6 °F (37 °C)  Heart Rate:  [72-98] 86  Resp:  [18-27] 18  BP: (104-119)/(65-81) 119/77    Physical Exam:     General Appearance:    Alert, cooperative, in no acute distress   Head:    Normocephalic, without obvious abnormality, atraumatic   Lungs:     Clear to auscultation,respirations regular, even and                  unlabored    Heart:    Regular rhythm and normal rate, normal S1 and S2, no            murmur, no gallop, no rub, no click   Abdomen:     Normal bowel sounds, no masses, no organomegaly, soft        non-tender, non-distended, no guarding, no rebound                tenderness   Extremities:   Moves all extremities well, no edema, no cyanosis, no             redness   Pulses:   Pulses palpable and equal bilaterally   Skin:   No bleeding, bruising or rash        Results Review:       Lab Results (last 24 hours)     Procedure Component Value Units Date/Time    Magnesium [[id number]]  (Normal) Collected: 02/02/22 0605    Specimen: Blood Updated: 02/02/22 0737     Magnesium 2.0 mg/dL     Basic Metabolic Panel [[id number]]  (Abnormal) Collected: 02/02/22 0605    Specimen: Blood Updated: 02/02/22 0650     Glucose 108 mg/dL      BUN 2 mg/dL      Creatinine 0.48 mg/dL      Sodium 141 mmol/L      Potassium 3.4 mmol/L      Chloride 106 mmol/L      CO2 28.8 mmol/L      Calcium 8.6 mg/dL      eGFR Non African Amer >150 mL/min/1.73      BUN/Creatinine Ratio 4.2     Anion Gap 6.2 mmol/L     Narrative:      GFR Normal >60  Chronic Kidney Disease <60  Kidney Failure <15      CBC & Differential [[id number]]  (Abnormal) Collected: 02/02/22 0605    Specimen: Blood Updated: 02/02/22 0617     Narrative:      The following orders were created for panel order CBC & Differential.  Procedure                               Abnormality         Status                     ---------                               -----------         ------                     CBC Auto Differential[[id number]]        Abnormal            Final result                 Please view results for these tests on the individual orders.    CBC Auto Differential [[id number]]  (Abnormal) Collected: 02/02/22 0605    Specimen: Blood Updated: 02/02/22 0617     WBC 4.84 10*3/mm3      RBC 3.58 10*6/mm3      Hemoglobin 10.4 g/dL      Hematocrit 31.1 %      MCV 86.9 fL      MCH 29.1 pg      MCHC 33.4 g/dL      RDW 14.3 %      RDW-SD 44.5 fl      MPV 10.1 fL      Platelets 185 10*3/mm3      Neutrophil % 67.0 %      Lymphocyte % 19.6 %      Monocyte % 10.5 %      Eosinophil % 1.7 %      Basophil % 1.0 %      Immature Grans % 0.2 %      Neutrophils, Absolute 3.24 10*3/mm3      Lymphocytes, Absolute 0.95 10*3/mm3      Monocytes, Absolute 0.51 10*3/mm3      Eosinophils, Absolute 0.08 10*3/mm3      Basophils, Absolute 0.05 10*3/mm3      Immature Grans, Absolute 0.01 10*3/mm3      nRBC 0.0 /100 WBC     Potassium [[id number]]  (Abnormal) Collected: 02/01/22 1754    Specimen: Blood Updated: 02/01/22 1837     Potassium 3.2 mmol/L      Comment: Slight hemolysis detected by analyzer. Results may be affected.       Magnesium [[id number]]  (Normal) Collected: 02/01/22 0746    Specimen: Blood Updated: 02/01/22 1058     Magnesium 1.9 mg/dL               Assessment/Plan       Intraabdominal hemorrhage    Nystagmus due to benign paroxysmal positional vertigo      Stable s/p lap lacey with postop bleeding requiring diagnostic lap, Hgb is stable now and from a surgical standpoint the patient is tolerating liquids and moving his bowels.  I do not know what is causing the nystagmus, MRI and CT of the head are negative for any acute or chronic issues.    Will defer to  Hospitalist service for other issues, can be discharged whenever OK with Hospitalist service.      Nj De Jesus MD  02/02/22  09:02 EST

## 2022-02-02 NOTE — TELEPHONE ENCOUNTER
Caller: [facility]     New or established patient?  [x] New  [] Established    Date of discharge: 02/02/2022    Facility discharged from: [place] [address]. Diagnosis/Symptoms: VERTIGO, Questionable Vertical Nystagmus, rule out MS?  Length of stay (If applicable): 5 DAYS    Specialty Only: Did you see a Confucianism health provider?    [x] Yes  [] No  If so, who? MOHSEN, BASHAR, MD     PTS INITIAL COMPLAINT WAS DIZZINESS, HOSP NOT MENTIONS SUBSTANCE ABUSE AND MENTIONS PT NEEDING TO SEE NEURO OPTHAMOLOGY, PLEASE ADVISE ON SCHEDULING FOR HOSP F/U

## 2022-02-03 NOTE — TELEPHONE ENCOUNTER
Left voice message instructing patient to call back and schedule appointment with Dr. Mcghee for hospital f-up possible MS.\  Christine

## 2022-02-07 RX ORDER — FAMOTIDINE 20 MG/1
20 TABLET, FILM COATED ORAL 2 TIMES DAILY
Qty: 90 TABLET | Refills: 1 | Status: SHIPPED | OUTPATIENT
Start: 2022-02-07 | End: 2022-05-16

## 2022-02-14 RX ORDER — LORATADINE 10 MG/1
10 TABLET ORAL DAILY
COMMUNITY

## 2022-02-14 RX ORDER — FLUTICASONE PROPIONATE 50 MCG
1-2 SPRAY, SUSPENSION (ML) NASAL DAILY
COMMUNITY

## 2022-02-14 RX ORDER — OMEPRAZOLE 40 MG/1
40 CAPSULE, DELAYED RELEASE ORAL DAILY
COMMUNITY
End: 2022-05-16

## 2022-02-14 RX ORDER — ALBUTEROL SULFATE 90 UG/1
2 AEROSOL, METERED RESPIRATORY (INHALATION) EVERY 6 HOURS PRN
COMMUNITY
End: 2022-02-14

## 2022-02-15 ENCOUNTER — TELEPHONE (OUTPATIENT)
Dept: SURGERY | Facility: CLINIC | Age: 27
End: 2022-02-15

## 2022-02-15 NOTE — TELEPHONE ENCOUNTER
"I called pt, he stated \"I am in the hospital in [place] waiting to be transferred to [place] for my low vitamins that is causing my eyes to not focus.\"  "

## 2022-04-01 NOTE — PROGRESS NOTES
"Patient: Milton Taylor    YOB: 1995    Date: 04/04/2022    Primary Care Provider: Lakeshia Yap MD    Chief Complaint   Patient presents with   • Post-op     Lap lacey       History of present illness:  I saw the patient in the office today as a followup from their recent laparoscopic cholecystectomy which was done 01/26/2022, it was done secondary to biliary dyskinesia.  Patient had subsequent exploratory laparoscopy with evacuation of a hematoma and control of bleeding from the liver bed performed 01/29/2022. They state that they have numbness in the abdomen.    It is interesting that the patient did have a history significant for an approximate 90 to 100 pound weight loss over the past year.  He did have folate deficiency and suffered from Wernicke's encephalopathy which he has subsequently been treated for and his nystagmus and dizziness is markedly improved.    Vital Signs:   Vitals:    04/04/22 1110   BP: 118/68   Pulse: 70   Resp: 18   Temp: 97.1 °F (36.2 °C)   TempSrc: Temporal   SpO2: 98%   Height: 172.7 cm (68\")       Physical Exam:   General Appearance:    Alert, cooperative, in no acute distress, no further nystagmus, ambulation seems normal   Abdomen:     no masses, no organomegaly, soft non-tender, non-distended, no guarding, wounds are well healed     Assessment / Plan :    1. Postoperative visit        I did discuss the situation with the patient today in the office and they have done well from their recent laparoscopic cholecystectomy with subsequent exploratory laparoscopy with evacuation of a hematoma and control of bleeding from the liver bed.  I have released the patient back to normal activity, they understand that they need to be careful about heavy lifting.  I need to see the patient back in the office only if they are having further problems, they know to call me if they are.    I have told the patient and the mother that we need to make sure that he keeps his neck " scheduled appointment with Dr. Yap for further follow-up of his history significant for Wernicke's encephalopathy.    Electronically signed by Nj De Jesus MD  04/04/22

## 2022-04-04 ENCOUNTER — OFFICE VISIT (OUTPATIENT)
Dept: SURGERY | Facility: CLINIC | Age: 27
End: 2022-04-04

## 2022-04-04 VITALS
SYSTOLIC BLOOD PRESSURE: 118 MMHG | OXYGEN SATURATION: 98 % | HEIGHT: 68 IN | RESPIRATION RATE: 18 BRPM | HEART RATE: 70 BPM | DIASTOLIC BLOOD PRESSURE: 68 MMHG | BODY MASS INDEX: 41.73 KG/M2 | TEMPERATURE: 97.1 F

## 2022-04-04 DIAGNOSIS — Z48.89 POSTOPERATIVE VISIT: Primary | ICD-10-CM

## 2022-04-04 PROCEDURE — 99024 POSTOP FOLLOW-UP VISIT: CPT | Performed by: SURGERY

## 2022-05-16 RX ORDER — FAMOTIDINE 20 MG/1
TABLET, FILM COATED ORAL
Qty: 90 TABLET | Refills: 1 | Status: SHIPPED | OUTPATIENT
Start: 2022-05-16 | End: 2023-03-16

## 2022-06-23 ENCOUNTER — OFFICE VISIT (OUTPATIENT)
Dept: OTOLARYNGOLOGY | Facility: CLINIC | Age: 27
End: 2022-06-23

## 2022-06-23 VITALS
DIASTOLIC BLOOD PRESSURE: 78 MMHG | HEIGHT: 68 IN | OXYGEN SATURATION: 98 % | SYSTOLIC BLOOD PRESSURE: 116 MMHG | WEIGHT: 251 LBS | BODY MASS INDEX: 38.04 KG/M2 | HEART RATE: 70 BPM

## 2022-06-23 DIAGNOSIS — H69.81 DYSFUNCTION OF RIGHT EUSTACHIAN TUBE: ICD-10-CM

## 2022-06-23 DIAGNOSIS — H73.891 TYMPANIC MEMBRANE RETRACTION, RIGHT: ICD-10-CM

## 2022-06-23 DIAGNOSIS — R43.0 POST-COVID CHRONIC LOSS OF SMELL: ICD-10-CM

## 2022-06-23 DIAGNOSIS — J34.2 DEVIATED SEPTUM: ICD-10-CM

## 2022-06-23 DIAGNOSIS — R43.9 SENSE OF SMELL ALTERED: Primary | ICD-10-CM

## 2022-06-23 DIAGNOSIS — U09.9 POST-COVID CHRONIC LOSS OF SMELL: ICD-10-CM

## 2022-06-23 DIAGNOSIS — R43.2 ALTERED TASTE: ICD-10-CM

## 2022-06-23 PROBLEM — H81.10: Status: RESOLVED | Noted: 2022-01-31 | Resolved: 2022-06-23

## 2022-06-23 PROBLEM — H69.90 EUSTACHIAN TUBE DYSFUNCTION: Status: ACTIVE | Noted: 2022-06-23

## 2022-06-23 PROBLEM — H69.80 EUSTACHIAN TUBE DYSFUNCTION: Status: ACTIVE | Noted: 2022-06-23

## 2022-06-23 PROCEDURE — 31231 NASAL ENDOSCOPY DX: CPT | Performed by: OTOLARYNGOLOGY

## 2022-06-23 PROCEDURE — 99203 OFFICE O/P NEW LOW 30 MIN: CPT | Performed by: OTOLARYNGOLOGY

## 2022-06-23 RX ORDER — FOLIC ACID 1 MG/1
1000 TABLET ORAL DAILY
COMMUNITY
Start: 2022-04-25

## 2022-06-23 RX ORDER — LANOLIN ALCOHOL/MO/W.PET/CERES
100 CREAM (GRAM) TOPICAL DAILY
COMMUNITY
Start: 2022-05-20

## 2022-06-23 RX ORDER — ERGOCALCIFEROL 1.25 MG/1
CAPSULE ORAL
COMMUNITY
Start: 2022-04-25

## 2022-06-23 RX ORDER — THIAMINE HCL 100 MG
TABLET ORAL
COMMUNITY
Start: 2022-04-25

## 2022-06-23 RX ORDER — ASCORBIC ACID 500 MG
500 TABLET ORAL 2 TIMES DAILY
COMMUNITY
Start: 2022-04-25

## 2022-06-23 RX ORDER — B-COMPLEX WITH VITAMIN C
1 TABLET ORAL DAILY
COMMUNITY
Start: 2022-04-25

## 2022-06-23 NOTE — PROGRESS NOTES
"       ENT New Office Consult Note     Date of Consult: 2022     Patient Name: Milton Taylor  MRN: [id number]   : 1995     Referring Provider: Lakeshia Yap MD    Care Team: Patient Care Team:  Lakeshia Yap MD as PCP - General (Family Medicine)     Chief Complaint:    Chief Complaint   Patient presents with   • Loss Of Smell     Loss of smell and taste after dx with COVID-19, onset 2021       History of Present Illness: Milton Taylor is a 27 y.o. male who presents today for NOSE.        FALL  HE CONTRACTED COVID. FOLLOWING THAT, HE NOTICED AN ALTERED SENSE OF SMELL AND TASTE. HE REPORTS CHICKEN SMELLING FOUL. HE LIMITS HIS DIET TO A FEW THINGS HE CAN TOLERATE NOW. SYMPTOMS HAVE SLOWLY IMPROVED OVER TIME.      Subjective      Review of Systems:   Review of Systems   HENT: Positive for ear discharge, ear pain and tinnitus. Negative for congestion, dental problem, drooling, facial swelling, hearing loss, mouth sores, nosebleeds, postnasal drip, rhinorrhea, sinus pressure, sneezing, sore throat, swollen glands, trouble swallowing and voice change.    Respiratory: Negative for cough, shortness of breath and wheezing.       I have reviewed and confirmed the accuracy of the ROS as documented by the MA/LPN/RN Gianna Koo MD     Pertinent items are noted in HPI.     Past Medical History:   Past Medical History:   Diagnosis Date   • Anxiety    • Chest pain     Intermittent chest pain for the last 7 to 8 years. Symptoms last for about a day at a time. Has radiated down his left arm in the past. Nothing known to relieve the pain, just \"goes away on its own\"   • Depression    • Depression    • GERD (gastroesophageal reflux disease)    • Hyperuricemia    • JULIEN (obstructive sleep apnea)    • PONV (postoperative nausea and vomiting)    • Scoliosis    • Sleep apnea     does not currently have a CPAP machine, has had one in the past   • Wears glasses        Past Surgical History: "   Past Surgical History:   Procedure Laterality Date   • CHOLECYSTECTOMY WITH INTRAOPERATIVE CHOLANGIOGRAM N/A 2022    Procedure: CHOLECYSTECTOMY LAPAROSCOPIC INTRAOPERATIVE CHOLANGIOGRAPHY;  Surgeon: Nj De Jesus MD;  Location: [facility] OR;  Service: General;  Laterality: N/A;   • DIAGNOSTIC LAPAROSCOPY N/A 2022    Procedure: DIAGNOSTIC LAPAROSCOPY evacuation of hematoma and control bleeding of liver bed;  Surgeon: Sonya Christopher MD;  Location: [facility] OR;  Service: General;  Laterality: N/A;   • EAR TUBES     • WISDOM TOOTH EXTRACTION      x4       Family History:   Family History   Problem Relation Age of Onset   • Hyperlipidemia Father    • Heart disease Father    • Cancer Maternal Grandfather         pancreatic       Social History:   Social History     Socioeconomic History   • Marital status: Single   Tobacco Use   • Smoking status: Former Smoker     Quit date:      Years since quittin.4   • Smokeless tobacco: Never Used   Vaping Use   • Vaping Use: Former   Substance and Sexual Activity   • Alcohol use: Never   • Drug use: Never   • Sexual activity: Defer       Medications:     Current Outpatient Medications:   •  ascorbic acid (VITAMIN C) 500 MG tablet, Take 500 mg by mouth 2 (Two) Times a Day., Disp: , Rfl:   •  B Complex Vitamins (Vitamin B Complex) tablet, Take 1 capsule by mouth Daily., Disp: , Rfl:   •  famotidine (PEPCID) 20 MG tablet, TAKE ONE TABLET BY MOUTH TWICE DAILY, Disp: 90 tablet, Rfl: 1  •  folic acid (FOLVITE) 1 MG tablet, Take 1,000 mcg by mouth Daily., Disp: , Rfl:   •  HYDROcodone-acetaminophen (NORCO) 7.5-325 MG per tablet, Take 1 tablet by mouth Every 6 (Six) Hours As Needed for Moderate Pain, Disp: 15 tablet, Rfl: 0  •  ondansetron (ZOFRAN) 4 MG tablet, Take 4 mg by mouth Every 8 (Eight) Hours As Needed for Nausea or Vomiting., Disp: , Rfl:   •  promethazine (PHENERGAN) 25 MG tablet, Take 25 mg by mouth Every 6 (Six) Hours As Needed for Nausea or Vomiting., Disp:  ", Rfl:   •  thiamine 100 MG tablet, Take 100 mg by mouth Daily., Disp: , Rfl:   •  vitamin B-12 (CYANOCOBALAMIN) 2500 MCG sublingual tablet tablet, DISSOLVE ONE TABLET UNDER THE TONGUE ONCE DAILY AS DIRECTED, Disp: , Rfl:   •  vitamin D (ERGOCALCIFEROL) 1.25 MG (82889 UT) capsule capsule, TAKE ONE CAPSULE BY MOUTH ONCE WEEKLY FOR THREE MONTHS, Disp: , Rfl:   •  famotidine (PEPCID) 20 MG tablet, Take 20 mg by mouth Daily As Needed., Disp: , Rfl:   •  fluticasone (FLONASE) 50 MCG/ACT nasal spray, 1-2 sprays into the nostril(s) as directed by provider Daily., Disp: , Rfl:   •  loratadine (CLARITIN) 10 MG tablet, Take 10 mg by mouth Daily., Disp: , Rfl:   •  meclizine (ANTIVERT) 25 MG tablet, Take 1 tablet by mouth 3 (Three) Times a Day As Needed for Dizziness., Disp: 30 tablet, Rfl: 0  •  nystatin (MYCOSTATIN) 100,000 unit/mL suspension, Take 200,000 Units by mouth 4 (Four) Times a Day., Disp: , Rfl:   •  potassium chloride (K-DUR,KLOR-CON) 20 MEQ CR tablet, TAKE ONE TABLET BY MOUTH TWICE DAILY WITH A FULL GLASS OF WATER, Disp: , Rfl:     Allergies:   Allergies   Allergen Reactions   • Penicillins Rash       Objective     Physical Exam:  Vital Signs:   Vitals:    06/23/22 1003   BP: 116/78   BP Location: Left arm   Patient Position: Sitting   Cuff Size: Large Adult   Pulse: 70   SpO2: 98%   Weight: 114 kg (251 lb)   Height: 172.7 cm (68\")     Body mass index is 38.16 kg/m².     Ears: hearing with OUT difficulty, auricles intact without deformity, external auditory canals clear    RIGHT: tympanic membrane with retraction    LEFT: tympanic membrane intact without perforation or visible effusion    General: alert, interactive, no acute distress  Head: normocephalic, atraumatic  Nose: no significant external deformity, no epistaxis Scant  Mouth: lips intact without deformity no oral mucosal lesions of concern, no oropharyngeal lesions or significant tonsillar asymmetry, no bleeding  Neck: trachea midline, no neck asymmetry "   Lung: no stridor or stertor, no respiratory distress  Cardiovascular: no cyanosis  Skin: no concerning skin lesions on face  Neuro: Cranial nerves II-XII otherwise grossly intact  Eye: no pathologic nystagmus  Extremities: no motor asymmetry on gross examination  Psych: appropriately interactive        Procedure:   $ Nasal / Sinus Endoscopy    Date/Time: 6/23/2022 10:41 AM  Performed by: Gianna Koo MD  Authorized by: Gianna Koo MD     Comments:      NASAL ENDOSCOPY:    After patient written consent and topicalization, the endoscope was inserted atraumatically into the bilateral nasal cavities. Septum is deviated RIGHT. Turbinates adequately reduce with decongestion.    Scant secretions. No masses or concerning lesions visualized in the nasal cavities or nasopharynx. The patient tolerated the procedure reasonably well.              Assessment / Plan      Assessment/Plan:   Diagnoses and all orders for this visit:    1. Sense of smell altered (Primary)  -     Nasal Endoscopy    2. Altered taste    3. Post-COVID chronic loss of smell    4. Deviated septum    5. Dysfunction of right eustachian tube    6. Tympanic membrane retraction, right         SCOPE SHOWS NO CONCERNING MASSES.    WE DISCUSSED SMELL EXERCISES TO FACILITATE RECOVERY OF TASTE/SMELL.    COUNSELED THIS CAN TAKE 12-18 MONTHS POST-VIRAL.     RECOMMEND GENTLE EAR POPPING FOR PERSISTENT RIGHT ETD.     RCK PRN.       Follow Up:   No follow-ups on file.      NYASIA Koo MD

## 2023-03-16 RX ORDER — FAMOTIDINE 20 MG/1
TABLET, FILM COATED ORAL
Qty: 90 TABLET | Refills: 1 | Status: SHIPPED | OUTPATIENT
Start: 2023-03-16

## 2023-04-30 NOTE — PROGRESS NOTES
"Chief Complaint  Depression, anxiety, and intrusive thoughts with compulsions.     Subjective          Milton Taylor presents to [facility] by himself for an initial evaluation. The patient was referred by self.    History of Present Illness: Milton states, \"I have depression, anxiety, and intrusive thoughts.\" He tells me the symptoms began as a child but have been worsening over time. He began treatment as a teen. He tells me he has moderate to severe symptoms of depression with lack of motivation, sadness, and no quentin. He denies suicidal ideations, intent or plan and tells me this was worse as a teen but not as much now. He tells me the constant intrusive thoughts makes him angry and irritable easily. He has thoughts such as 'if you don't pick the right shirt, this will happen\" or \"if you do this, God will punish you\" or \"something bad will happen if you don't do exactly what your brain tells you to do.\" He reports having a time he was able to ignore the thoughts and resist the urges but not now. They take up many hours of his day and interfere with daily functioning. He has to wash his hands for fear of contamination. His right hand is red and excoriated. He has to pray a certain way and engage in certain rituals. He has to plug things into certain places or he fears something bad will happen. He cannot fall asleep due to the intrusive thoughts. He likes to make up stories in his head for distraction or pray but sometimes praying can worsen this. He believes he gets 7-9 hours per night. He reports having COVID in September of 2021 and was losing weight due to change in taste and smell. By November, his taste was so impaired that he stopped eating because \"everything tasted rotten.\" His gallbladder was also malfunctioning around the same time. He lost 100 lbs and was diagnosed with Wernicke's encephalopathy from vitamin deficiencies. He has numbness in his legs, " nystagmus, and impaired memory. His memory is still affected. He is eating again. He is not taking any psychiatric medications. He denies any SI/HI/AVH.      Past Psychiatric History: Milton began psychiatric treatment with [facility] as a teen. He started because he was self-harming himself by cutting. He has not done this in a long time and denies cravings. He rarely has thoughts. He denies any suicide attempts in the past but once felt so overwhelmed that he may have if his depression had not been so severe. He denies any inpatient psychiatric hospitalizations or residential treatments. He believes he tried Prozac but may not have taken it long enough.     Substance Use/Abuse: Milton began using cannabis, nicotine, and alcohol as a teen. He was a cigarette smoker socially but has not smoked in three years. He denies any cravings. He drank from [age] with friends and thinks about having a drink sometimes but denies cravings. He denies blackouts, withdrawals, or DTs. He denies legal problems related to alcohol use. He began using cannabis and used about 10 times before experiencing panic. He last used 5 years ago and denies cravings or legal problems. He denies the use of any other illicit substances.     Family Psychiatric History: Milton's biological mother has OCD, PTSD, JEREMIE, depression, and intrusive thoughts. His maternal grandmother had paranoia. His biological father has intrusive thoughts, anxiety, and depression. He has two paternal uncles with bipolar disorder and an aunt with schizophrenia.      Developmental History: Milton was born in [place], [place] a few weeks early and ingested meconium but is unsure if he had to stay in a NICU. He was a vaginal delivery. He was raised in [place] after 2 by his mother. His parents never  and he has no siblings. He walked at 9 months old but believes all other milestones were met on time. He was able to make and keep friends, He got in some trouble  "at school for skipping classes. He did not like school and feels he could have put more effort into it. He is in college and has 7 classes left in medical information technology. He reports having a good relationship with his parents. He has not been in a long-term relationship, only dating. He is interested in men. He finds it hard to work because he constantly worries about his performance and he fears change and having to learn something new.     Social History: Milton currently lives in [place], [place] with his mother. He has no children and has not been . He is in college. He enjoys reading and making up stories. He denies the use of alcohol, nicotine, and illicit drugs.     Objective   Vital Signs:   /80   Pulse 81   Ht 172.7 cm (68\")   Wt 129 kg (285 lb)   BMI 43.33 kg/m²       PHQ-9 Score:   PHQ-9 Total Score: 20     JEREMIE-7 Score:  JEREMIE 7 Total Score: 21    Mental Status Exam:   Hygiene:   good  Cooperation:  Cooperative  Eye Contact:  Fair  Psychomotor Behavior:  Restless  Affect:  Restricted  Mood: anxious  Speech:  Monotone  Thought Process:  Goal directed and Linear  Thought Content:  Normal  Suicidal:  None  Homicidal:  None  Hallucinations:  None  Delusion:  None  Memory:  Intact  Orientation:  Person, Place, Time and Situation  Reliability:  good  Insight:  Good  Judgement:  Good  Impulse Control:  Good  Physical/Medical Issues:  Yes History of Wernick's encephalopathy, biliary dyskinesia, scoliosis, headaches, clausterphobia, back pain, bronchitis, and arthritis     Current Medications:   Current Outpatient Medications   Medication Sig Dispense Refill   • ascorbic acid (VITAMIN C) 500 MG tablet Take 1 tablet by mouth 2 (Two) Times a Day.     • B Complex Vitamins (Vitamin B Complex) tablet Take 1 capsule by mouth Daily.     • famotidine (PEPCID) 20 MG tablet Take 1 tablet by mouth Daily As Needed.     • fluticasone (FLONASE) 50 MCG/ACT nasal spray 1-2 sprays into the nostril(s) as " directed by provider Daily.     • folic acid (FOLVITE) 1 MG tablet Take 1 tablet by mouth Daily.     • nystatin (MYCOSTATIN) 100,000 unit/mL suspension Take 2 mL by mouth 4 (Four) Times a Day.     • ondansetron (ZOFRAN) 4 MG tablet Take 1 tablet by mouth Every 8 (Eight) Hours As Needed for Nausea or Vomiting.     • promethazine (PHENERGAN) 25 MG tablet Take 1 tablet by mouth Every 6 (Six) Hours As Needed for Nausea or Vomiting.     • thiamine 100 MG tablet Take 1 tablet by mouth Daily.     • vitamin B-12 (CYANOCOBALAMIN) 2500 MCG sublingual tablet tablet DISSOLVE ONE TABLET UNDER THE TONGUE ONCE DAILY AS DIRECTED     • hydrOXYzine (ATARAX) 25 MG tablet Take 1 tablet by mouth 3 (Three) Times a Day As Needed for Anxiety. 90 tablet 2   • sertraline (Zoloft) 50 MG tablet Take 1 tablet by mouth Every Night. 30 tablet 2     No current facility-administered medications for this visit.   Physical Exam  Vitals and nursing note reviewed.   Constitutional:       Appearance: Normal appearance. He is well-developed. He is obese.   Musculoskeletal:         General: Normal range of motion.   Skin:     General: Skin is warm and dry.   Neurological:      General: No focal deficit present.      Mental Status: He is alert and oriented to person, place, and time.   Psychiatric:         Attention and Perception: Attention normal.         Mood and Affect: Mood is anxious.         Speech: Speech normal.         Behavior: Behavior normal. Hyperactive: restless. Behavior is cooperative.         Thought Content: Thought content normal.         Cognition and Memory: Cognition normal.         Judgment: Judgment normal.        Result Review :                 Assessment and Plan    Diagnoses and all orders for this visit:    1. Obsessive-compulsive disorder, unspecified type (Primary)  -     sertraline (Zoloft) 50 MG tablet; Take 1 tablet by mouth Every Night.  Dispense: 30 tablet; Refill: 2  -     hydrOXYzine (ATARAX) 25 MG tablet; Take 1  tablet by mouth 3 (Three) Times a Day As Needed for Anxiety.  Dispense: 90 tablet; Refill: 2    2. Moderate recurrent major depression  Comments:  patient believes he was diagnosed with bipolar disorder in the past    3. JEREMIE (generalized anxiety disorder)         Impression:  -This is an initial evaluation of the patient. Milton is a single, 28-year-old , male who presents by himself for a medication evaluation. Milton has been having a difficult time with depression, anxiety, and OCD. He has intrusive thoughts which are beyond his control and he only finds relief through compulsions. He tells me the thoughts and acts take up a large part of his day and interfere with functioning. He is in school but find it difficult to work due to constant worry about performance. He was in treatment as a teen but has not been treated in some. He was very sick over the last few years and developed Wernicke's encephalopathy os he is taking many vitamins. He may have some autoimmune condition and he will be evaluated on May 10th. He is interested in medication management for his symptoms, in particular, treatment for anxiety. We discussed options including trying Prozac again versus trying Zoloft or Luvox. I explained the mechanism of action and purpose of both, as well as risks versus benefits and adverse effects. He feels Zoloft may be helpful. I suggested using an as needed medication for anxiety to help when he is feeling overly anxious..I suggested using Hydroxyzine and he agrees to try. Lastly, I suggested adding therapy to the treatment plan as this is the most effective, evidence-based treatment for OCD. I provided resources for the patient.   -Initiate Zoloft 50 mg nightly for depression and anxiety.  -Initiate Hydroxyzine 25 mg three times daily for anxiety.  -Consider therapy.     TREATMENT PLAN/GOALS: Continue supportive psychotherapy efforts and medications as indicated. Treatment and medication options  discussed during today's visit. Patient ackowledged and verbally consented to continue with current treatment plan and was educated on the importance of compliance with treatment and follow-up appointments.    MEDICATION ISSUES:    We discussed risks, benefits, and side effects of the above medications and the patient was agreeable with the plan. Patient was educated on the importance of compliance with treatment and follow-up appointments.  Patient is agreeable to call the office with any worsening of symptoms or onset of side effects. Patient is agreeable to call 911 or go to the nearest ER should he/she begin having SI/HI.      Counseled patient regarding multimodal approach with healthy nutrition, healthy sleep, regular physical activity, social activities, counseling, and medications.      Coping skills reviewed and encouraged positive framing of thoughts     Assisted patient in processing above session content; acknowledged and normalized patient's thoughts, feelings, and concerns.  Applied  positive coping skills and behavior management in session.  Allowed patient to freely discuss issues without interruption or judgment. Provided safe, confidential environment to facilitate the development of positive therapeutic relationship and encourage open, honest communication. Assisted patient in identifying risk factors which would indicate the need for higher level of care including thoughts to harm self or others and/or self-harming behavior and encouraged patient to contact this office, call 911, or present to the nearest emergency room should any of these events occur. Discussed crisis intervention services and means to access.     MEDS ORDERED DURING VISIT:  New Medications Ordered This Visit   Medications   • sertraline (Zoloft) 50 MG tablet     Sig: Take 1 tablet by mouth Every Night.     Dispense:  30 tablet     Refill:  2   • hydrOXYzine (ATARAX) 25 MG tablet     Sig: Take 1 tablet by mouth 3 (Three) Times  a Day As Needed for Anxiety.     Dispense:  90 tablet     Refill:  2           Follow Up   Return in about 4 weeks (around 5/30/2023) for Medication Check.    Patient was given instructions and counseling regarding his condition or for health maintenance advice. Please see specific information pulled into the AVS if appropriate.     This document has been electronically signed by ALESSANDRA Rodríguez  May 2, 2023 22:02 EDT      This document has been electronically signed by ALESSANDRA Ruiz, PMHNP-BC  May 2, 2023 22:02 EDT    Part of this note may be an electronic transcription/translation of spoken language to printed text using the Dragon Dictation System.

## 2023-05-02 ENCOUNTER — OFFICE VISIT (OUTPATIENT)
Dept: PSYCHIATRY | Facility: CLINIC | Age: 28
End: 2023-05-02
Payer: COMMERCIAL

## 2023-05-02 VITALS
HEIGHT: 68 IN | DIASTOLIC BLOOD PRESSURE: 80 MMHG | HEART RATE: 81 BPM | SYSTOLIC BLOOD PRESSURE: 128 MMHG | WEIGHT: 285 LBS | BODY MASS INDEX: 43.19 KG/M2

## 2023-05-02 DIAGNOSIS — F33.1 MODERATE RECURRENT MAJOR DEPRESSION: ICD-10-CM

## 2023-05-02 DIAGNOSIS — F41.1 GAD (GENERALIZED ANXIETY DISORDER): ICD-10-CM

## 2023-05-02 DIAGNOSIS — F42.9 OBSESSIVE-COMPULSIVE DISORDER, UNSPECIFIED TYPE: Primary | ICD-10-CM

## 2023-05-02 PROCEDURE — 90792 PSYCH DIAG EVAL W/MED SRVCS: CPT | Performed by: NURSE PRACTITIONER

## 2023-05-02 PROCEDURE — 1160F RVW MEDS BY RX/DR IN RCRD: CPT | Performed by: NURSE PRACTITIONER

## 2023-05-02 PROCEDURE — 1159F MED LIST DOCD IN RCRD: CPT | Performed by: NURSE PRACTITIONER

## 2023-05-02 RX ORDER — HYDROXYZINE HYDROCHLORIDE 25 MG/1
25 TABLET, FILM COATED ORAL 3 TIMES DAILY PRN
Qty: 90 TABLET | Refills: 2 | Status: SHIPPED | OUTPATIENT
Start: 2023-05-02

## 2023-06-04 NOTE — PROGRESS NOTES
"Chief Complaint    Depression, anxiety, and intrusive thoughts with compulsions.        Subjective          Milton Taylor presents to [facility] by himself for a follow up and medication check.    History of Present Illness: Milton states, \"I am okay.\" Milton tells me he was aggravated before arriving since his mother's appointment ran late and he was late to arrive here. He tells me he is noticing a change in his mood and anxiety but it is slight. He denies side effects but notes he sweats more. He states, \"I have always been sweaty a little easier than most.\" He has used Hydroxyzine and finds it helps him sleep and it helps when he wants to stop obsessing and engaging in compulsions. He is on a break from class this summer. He has been trying new restaurants and saw a new movie recently. He still has some trouble with sleep but hydroxyzine helped him fall and stay asleep. He reports a decrease in appetite. A 5 lbs weight loss is noted.  He is taking Zoloft and Hydroxyzine. He denies any side effects. He denies any SI/HI/AVH.    Current Medications:   Current Outpatient Medications   Medication Sig Dispense Refill    ascorbic acid (VITAMIN C) 500 MG tablet Take 1 tablet by mouth 2 (Two) Times a Day.      folic acid (FOLVITE) 1 MG tablet Take 1 tablet by mouth Daily.      hydrOXYzine (ATARAX) 25 MG tablet Take 1 tablet by mouth 3 (Three) Times a Day As Needed for Anxiety. 90 tablet 2    sertraline (Zoloft) 100 MG tablet Take 1 tablet by mouth Every Night for 14 days, THEN 1.5 tablets Every Night for 14 days. 30 tablet 0    thiamine 100 MG tablet Take 1 tablet by mouth Daily.      vitamin B-12 (CYANOCOBALAMIN) 2500 MCG sublingual tablet tablet DISSOLVE ONE TABLET UNDER THE TONGUE ONCE DAILY AS DIRECTED      B Complex Vitamins (Vitamin B Complex) tablet Take 1 capsule by mouth Daily. (Patient not taking: Reported on 6/6/2023)      cetirizine (zyrTEC) 10 MG tablet Take 1 " "tablet by mouth At Night As Needed. (Patient not taking: Reported on 6/6/2023)      famotidine (PEPCID) 20 MG tablet Take 1 tablet by mouth Daily As Needed. (Patient not taking: Reported on 6/6/2023)      fluticasone (FLONASE) 50 MCG/ACT nasal spray 1-2 sprays into the nostril(s) as directed by provider Daily. (Patient not taking: Reported on 6/6/2023)      nystatin (MYCOSTATIN) 100,000 unit/mL suspension Take 2 mL by mouth 4 (Four) Times a Day. (Patient not taking: Reported on 6/6/2023)      ondansetron (ZOFRAN) 4 MG tablet Take 1 tablet by mouth Every 8 (Eight) Hours As Needed for Nausea or Vomiting. (Patient not taking: Reported on 6/6/2023)      promethazine (PHENERGAN) 25 MG tablet Take 1 tablet by mouth Every 6 (Six) Hours As Needed for Nausea or Vomiting. (Patient not taking: Reported on 6/6/2023)       No current facility-administered medications for this visit.         Objective   Vital Signs:   /98   Pulse 76   Ht 172.7 cm (68\")   Wt 127 kg (280 lb)   BMI 42.57 kg/m²     Physical Exam  Vitals and nursing note reviewed.   Constitutional:       Appearance: Normal appearance.   Neurological:      General: No focal deficit present.      Mental Status: He is alert and oriented to person, place, and time.      Result Review :                   Assessment and Plan    Diagnoses and all orders for this visit:    1. Obsessive-compulsive disorder, unspecified type (Primary)  -     sertraline (Zoloft) 100 MG tablet; Take 1 tablet by mouth Every Night for 14 days, THEN 1.5 tablets Every Night for 14 days.  Dispense: 30 tablet; Refill: 0    2. Moderate recurrent major depression    3. JEREMIE (generalized anxiety disorder)         MENTAL STATUS EXAM   General Appearance:  Cleanly groomed and dressed and well developed  Eye Contact:  Good eye contact  Attitude:  Cooperative  Motor Activity:  Normal gait, posture  Muscle Strength:  Normal  Speech:  Normal rate, tone, volume  Language:  Spontaneous  Mood and affect:  " "Normal, pleasant  Hopelessness:  Denies  Thought Process:  Logical, goal-directed and linear  Associations/ Thought Content:  No delusions  Hallucinations:  None  Suicidal Ideations:  Not present  Homicidal Ideation:  Not present  Sensorium:  Alert and clear  Orientation:  Person, place, time and situation  Immediate Recall, Recent, and Remote Memory:  Intact  Attention Span/ Concentration:  Good  Fund of Knowledge:  Appropriate for age and educational level  Intellectual Functioning:  Average range  Insight:  Fair  Judgement:  Fair  Reliability:  Good  Impulse Control:  Fair   Physical/Medical Issues:  Yes History of Wernick's encephalopathy, biliary dyskinesia, scoliosis, headaches, clausterphobia, back pain, bronchitis, and arthritis      PHQ-9 Score:   PHQ-9 Total Score: 19     JEREMIE-7 Score:  JEREMIE 7 Total Score: 18    Impression/Plan:  -This is a follow up and medication check. Milton reports ongoing moderate depression and severe anxiety but some symptoms have started to improve. He feels there is a lot of room to improve and remains motivated to adjust medications to target obsessive and compulsive behaviors. He reports using Hydroxyzine when \"he is disgusted with himself\" for not being able to control thoughts. It helps him sleep and stay sleep. He is trying to find activities he enjoys while he is on a break from school. His appetite is decreased and there was a 5 lbs weight loss since last visit. I suggested a rapid titration upward of Zoloft to target symptoms. I encouraged him to monitor for an increase in sweating or possible worsening of mood. He agrees. In the future, we may need to consider Abilify to target dopamine and serotonin to further manage mood and anxiety.   -Increase Zoloft to 100 mg nightly for two weeks and then 150 mg nightly for depression and anxiety.  -Continue Hydroxyzine 25 mg three times daily for anxiety. Patient has refills.   -Consider therapy.     MEDS ORDERED DURING VISIT:  New " Medications Ordered This Visit   Medications    sertraline (Zoloft) 100 MG tablet     Sig: Take 1 tablet by mouth Every Night for 14 days, THEN 1.5 tablets Every Night for 14 days.     Dispense:  30 tablet     Refill:  0         Follow Up   Return in about 6 weeks (around 7/18/2023) for Medication Check.  Patient was given instructions and counseling regarding his condition or for health maintenance advice. Please see specific information pulled into the AVS if appropriate.       TREATMENT PLAN/GOALS: Continue supportive psychotherapy efforts and medications as indicated. Treatment and medication options discussed during today's visit. Patient acknowledged and verbally consented to continue with current treatment plan and was educated on the importance of compliance with treatment and follow-up appointments.    MEDICATION ISSUES:  Discussed medication options and treatment plan of prescribed medication as well as the risks, benefits, and side effects including potential falls, possible impaired driving and metabolic adversities among others. Patient is agreeable to call the office with any worsening of symptoms or onset of side effects. Patient is agreeable to call 911 or go to the nearest ER should he/she begin having SI/HI.        This document has been electronically signed by ALESSANDRA Ruiz, PMHNP-BC  June 6, 2023 20:54 EDT    Part of this note may be an electronic transcription/translation of spoken language to printed text using the Dragon Dictation System.

## 2023-06-06 ENCOUNTER — OFFICE VISIT (OUTPATIENT)
Dept: PSYCHIATRY | Facility: CLINIC | Age: 28
End: 2023-06-06
Payer: COMMERCIAL

## 2023-06-06 VITALS
DIASTOLIC BLOOD PRESSURE: 98 MMHG | HEART RATE: 76 BPM | WEIGHT: 280 LBS | SYSTOLIC BLOOD PRESSURE: 124 MMHG | BODY MASS INDEX: 42.44 KG/M2 | HEIGHT: 68 IN

## 2023-06-06 DIAGNOSIS — F41.1 GAD (GENERALIZED ANXIETY DISORDER): Chronic | ICD-10-CM

## 2023-06-06 DIAGNOSIS — F42.9 OBSESSIVE-COMPULSIVE DISORDER, UNSPECIFIED TYPE: Primary | Chronic | ICD-10-CM

## 2023-06-06 DIAGNOSIS — F33.1 MODERATE RECURRENT MAJOR DEPRESSION: Chronic | ICD-10-CM

## 2023-06-06 PROCEDURE — 1159F MED LIST DOCD IN RCRD: CPT | Performed by: NURSE PRACTITIONER

## 2023-06-06 PROCEDURE — 1160F RVW MEDS BY RX/DR IN RCRD: CPT | Performed by: NURSE PRACTITIONER

## 2023-06-06 PROCEDURE — 99214 OFFICE O/P EST MOD 30 MIN: CPT | Performed by: NURSE PRACTITIONER

## 2023-06-06 RX ORDER — SERTRALINE HYDROCHLORIDE 100 MG/1
TABLET, FILM COATED ORAL
Qty: 30 TABLET | Refills: 0 | Status: SHIPPED | OUTPATIENT
Start: 2023-06-06 | End: 2023-07-04

## 2023-06-06 RX ORDER — CETIRIZINE HYDROCHLORIDE 10 MG/1
10 TABLET ORAL NIGHTLY PRN
COMMUNITY
Start: 2023-05-26

## 2023-07-20 PROBLEM — M41.9 SCOLIOSIS: Status: ACTIVE | Noted: 2023-04-14

## 2023-07-20 PROBLEM — H53.19 OSCILLOPSIA: Status: ACTIVE | Noted: 2022-07-14

## 2023-07-20 PROBLEM — H54.3 UNQUALIFIED VISUAL LOSS, BOTH EYES: Status: ACTIVE | Noted: 2022-07-14

## 2023-07-20 PROBLEM — E51.2 WERNICKE ENCEPHALOPATHY: Status: ACTIVE | Noted: 2022-07-14

## 2023-07-20 PROBLEM — F41.9 ANXIETY: Status: ACTIVE | Noted: 2023-04-14

## 2023-07-20 PROBLEM — H55.09 VERTICAL NYSTAGMUS: Status: ACTIVE | Noted: 2022-07-14

## 2023-07-20 PROBLEM — G32.81 PARANEOPLASTIC CEREBELLAR ATAXIA: Status: ACTIVE | Noted: 2022-07-14

## 2023-07-20 PROBLEM — H53.453 OTHER LOCALIZED VISUAL FIELD DEFECT, BILATERAL: Status: ACTIVE | Noted: 2022-07-14

## 2023-07-20 PROBLEM — K21.9 GERD (GASTROESOPHAGEAL REFLUX DISEASE): Status: ACTIVE | Noted: 2023-04-14

## 2023-07-20 PROBLEM — D49.9 PARANEOPLASTIC CEREBELLAR ATAXIA: Status: ACTIVE | Noted: 2022-07-14

## 2023-07-20 PROBLEM — H52.13 MYOPIA OF BOTH EYES: Status: ACTIVE | Noted: 2022-07-14

## 2023-08-27 NOTE — PROGRESS NOTES
"Chief Complaint    Depression, anxiety, and intrusive thoughts with compulsions.        Subjective          Milton Taylor presents to [facility] by himself for a follow up and medication check.    History of Present Illness: Milton states, \"I am okay.\" Milton tells me he is feeling better with depression but more anxiety. He does not find Hydroxyzine helpful in the daytime at all. He still has trouble going to sleep and was once to go to the bathroom and returns to bed. He feels the amount of sleep is adequate. He had an ear infection and is now working with pain management for back pain. He has a muscle relaxer and is scheduled to have a nerve root block on 09/05/23. His appetite is increased. He is thinking of starting talk therapy.   He reports compliance with his medications.  He is taking Paxil and Hydroxyzine Pamoate. He denies any side effects. He denies any SI/HI/AVH.    Current Medications:   Current Outpatient Medications   Medication Sig Dispense Refill    ascorbic acid (VITAMIN C) 500 MG tablet Take 1 tablet by mouth 2 (Two) Times a Day.      baclofen (LIORESAL) 10 MG tablet       famotidine (PEPCID) 20 MG tablet Take 1 tablet by mouth Daily As Needed.      fluticasone (FLONASE) 50 MCG/ACT nasal spray 1-2 sprays into the nostril(s) as directed by provider Daily.      folic acid (FOLVITE) 1 MG tablet Take 1 tablet by mouth Daily.      hydrOXYzine pamoate (VISTARIL) 25 MG capsule Take 1-2 capsules by mouth At Night As Needed for Anxiety. 60 capsule 2    ofloxacin (FLOXIN) 0.3 % otic solution       PARoxetine (Paxil) 30 MG tablet Take 1 tablet by mouth Daily. 30 tablet 2    thiamine 100 MG tablet Take 1 tablet by mouth Daily.      vitamin B-12 (CYANOCOBALAMIN) 2500 MCG sublingual tablet tablet DISSOLVE ONE TABLET UNDER THE TONGUE ONCE DAILY AS DIRECTED       No current facility-administered medications for this visit.         Objective   Vital Signs:   /82   " "Pulse 76   Ht 172.7 cm (68\")   Wt 128 kg (282 lb)   BMI 42.88 kg/mý     Physical Exam  Vitals and nursing note reviewed.   Constitutional:       Appearance: Normal appearance.   Neurological:      General: No focal deficit present.      Mental Status: He is alert and oriented to person, place, and time.      Result Review :          Assessment and Plan    Diagnoses and all orders for this visit:    1. JEREMIE (generalized anxiety disorder) (Primary)    2. Moderate recurrent major depression  -     PARoxetine (Paxil) 30 MG tablet; Take 1 tablet by mouth Daily.  Dispense: 30 tablet; Refill: 2    3. Obsessive-compulsive disorder, unspecified type  -     PARoxetine (Paxil) 30 MG tablet; Take 1 tablet by mouth Daily.  Dispense: 30 tablet; Refill: 2         MENTAL STATUS EXAM   General Appearance:  Cleanly groomed and dressed and well developed  Eye Contact:  Good eye contact  Attitude:  Cooperative  Motor Activity:  Normal gait, posture  Muscle Strength:  Normal  Speech:  Soft spoken  Language:  Spontaneous  Mood and affect:  Normal, pleasant  Hopelessness:  2  Thought Process:  Logical, goal-directed and linear  Associations/ Thought Content:  No delusions  Hallucinations:  None  Suicidal Ideations:  Not present  Homicidal Ideation:  Not present  Sensorium:  Alert and clear  Orientation:  Person, place, time and situation  Immediate Recall, Recent, and Remote Memory:  Intact  Attention Span/ Concentration:  Good  Fund of Knowledge:  Appropriate for age and educational level  Intellectual Functioning:  Average range  Insight:  Fair  Judgement:  Fair  Reliability:  Good  Impulse Control:  Fair   Physical/Medical Issues:  Yes History of Wernick's encephalopathy, biliary dyskinesia, scoliosis, headaches, clausterphobia, back pain, bronchitis, and arthritis      PHQ-9 Score:   PHQ-9 Total Score: 22     JEREMIE-7 Score:  JEREMIE 7 Total Score: 21    Impression/Plan:  -This is a follow up and medication check. Milton reports improved " depression but increased anxiety. He does not find Hydroxyzine helpful at all in the day. He continues to struggle to fall asleep at night. He is being treated for back pain and an ear infection. He is back in school. He is thinking of starting therapy and we explored options and his past experiences. He has someone at [facility] in mind but I also suggested [facility] or [facility] as well. He is motivated to make medication adjustments as needed to improve anxiety.   -Increase Paxil to 30 mg daily for depression and anxiety.  -Continue Hydroxyzine Pamoate 25-50 mg nightly as needed for anxiety. Patient has refills.   -Consider therapy.   -Collect GeneSight test.    MEDS ORDERED DURING VISIT:  New Medications Ordered This Visit   Medications    PARoxetine (Paxil) 30 MG tablet     Sig: Take 1 tablet by mouth Daily.     Dispense:  30 tablet     Refill:  2         Follow Up   Return in about 2 months (around 10/31/2023) for Medication Check.  Patient was given instructions and counseling regarding his condition or for health maintenance advice. Please see specific information pulled into the AVS if appropriate.       TREATMENT PLAN/GOALS: Continue supportive psychotherapy efforts and medications as indicated. Treatment and medication options discussed during today's visit. Patient acknowledged and verbally consented to continue with current treatment plan and was educated on the importance of compliance with treatment and follow-up appointments.    MEDICATION ISSUES:  Discussed medication options and treatment plan of prescribed medication as well as the risks, benefits, and side effects including potential falls, possible impaired driving and metabolic adversities among others. Patient is agreeable to call the office with any worsening of symptoms or onset of side effects. Patient is agreeable to call 911 or go to the nearest ER should he/she begin having SI/HI.        This document has been electronically signed by  ALESSANDRA Ruiz, PMHNP-BC  September 1, 2023 21:19 EDT    Part of this note may be an electronic transcription/translation of spoken language to printed text using the Dragon Dictation System.

## 2023-08-31 ENCOUNTER — OFFICE VISIT (OUTPATIENT)
Dept: PSYCHIATRY | Facility: CLINIC | Age: 28
End: 2023-08-31
Payer: COMMERCIAL

## 2023-08-31 VITALS
SYSTOLIC BLOOD PRESSURE: 112 MMHG | WEIGHT: 282 LBS | DIASTOLIC BLOOD PRESSURE: 82 MMHG | BODY MASS INDEX: 42.74 KG/M2 | HEIGHT: 68 IN | HEART RATE: 76 BPM

## 2023-08-31 DIAGNOSIS — F42.9 OBSESSIVE-COMPULSIVE DISORDER, UNSPECIFIED TYPE: Chronic | ICD-10-CM

## 2023-08-31 DIAGNOSIS — F41.1 GAD (GENERALIZED ANXIETY DISORDER): Primary | Chronic | ICD-10-CM

## 2023-08-31 DIAGNOSIS — F33.1 MODERATE RECURRENT MAJOR DEPRESSION: Chronic | ICD-10-CM

## 2023-08-31 PROBLEM — G40.209 COMPLEX PARTIAL SEIZURE WITH IMPAIRMENT OF CONSCIOUSNESS: Chronic | Status: ACTIVE | Noted: 2023-06-16

## 2023-08-31 PROCEDURE — 1160F RVW MEDS BY RX/DR IN RCRD: CPT | Performed by: NURSE PRACTITIONER

## 2023-08-31 PROCEDURE — 1159F MED LIST DOCD IN RCRD: CPT | Performed by: NURSE PRACTITIONER

## 2023-08-31 PROCEDURE — 99214 OFFICE O/P EST MOD 30 MIN: CPT | Performed by: NURSE PRACTITIONER

## 2023-08-31 RX ORDER — BACLOFEN 10 MG/1
TABLET ORAL
COMMUNITY
Start: 2023-08-11

## 2023-08-31 RX ORDER — OFLOXACIN 3 MG/ML
SOLUTION AURICULAR (OTIC)
COMMUNITY
Start: 2023-08-30

## 2023-08-31 RX ORDER — PAROXETINE 30 MG/1
30 TABLET, FILM COATED ORAL DAILY
Qty: 30 TABLET | Refills: 2 | Status: SHIPPED | OUTPATIENT
Start: 2023-08-31

## 2023-10-26 ENCOUNTER — OFFICE VISIT (OUTPATIENT)
Dept: PSYCHIATRY | Facility: CLINIC | Age: 28
End: 2023-10-26
Payer: COMMERCIAL

## 2023-10-26 VITALS
OXYGEN SATURATION: 97 % | SYSTOLIC BLOOD PRESSURE: 128 MMHG | DIASTOLIC BLOOD PRESSURE: 88 MMHG | BODY MASS INDEX: 44.75 KG/M2 | HEART RATE: 82 BPM | WEIGHT: 294.3 LBS

## 2023-10-26 DIAGNOSIS — G47.09 OTHER INSOMNIA: Chronic | ICD-10-CM

## 2023-10-26 DIAGNOSIS — F41.1 GAD (GENERALIZED ANXIETY DISORDER): Chronic | ICD-10-CM

## 2023-10-26 DIAGNOSIS — F33.1 MODERATE RECURRENT MAJOR DEPRESSION: Primary | Chronic | ICD-10-CM

## 2023-10-26 DIAGNOSIS — F42.9 OBSESSIVE-COMPULSIVE DISORDER, UNSPECIFIED TYPE: Chronic | ICD-10-CM

## 2023-10-26 PROCEDURE — 1159F MED LIST DOCD IN RCRD: CPT | Performed by: NURSE PRACTITIONER

## 2023-10-26 PROCEDURE — 99214 OFFICE O/P EST MOD 30 MIN: CPT | Performed by: NURSE PRACTITIONER

## 2023-10-26 PROCEDURE — 1160F RVW MEDS BY RX/DR IN RCRD: CPT | Performed by: NURSE PRACTITIONER

## 2023-10-26 RX ORDER — TRAZODONE HYDROCHLORIDE 50 MG/1
50 TABLET ORAL NIGHTLY
Qty: 30 TABLET | Refills: 2 | Status: SHIPPED | OUTPATIENT
Start: 2023-10-26

## 2023-10-26 RX ORDER — PAROXETINE 30 MG/1
30 TABLET, FILM COATED ORAL DAILY
Qty: 30 TABLET | Refills: 2 | Status: SHIPPED | OUTPATIENT
Start: 2023-10-26

## 2023-10-26 RX ORDER — BUPROPION HYDROCHLORIDE 150 MG/1
150 TABLET ORAL DAILY
Qty: 30 TABLET | Refills: 2 | Status: SHIPPED | OUTPATIENT
Start: 2023-10-26

## 2023-10-26 RX ORDER — AZITHROMYCIN 250 MG/1
TABLET, FILM COATED ORAL
COMMUNITY
Start: 2023-08-30

## 2023-12-19 ENCOUNTER — OFFICE VISIT (OUTPATIENT)
Dept: PSYCHIATRY | Facility: CLINIC | Age: 28
End: 2023-12-19
Payer: COMMERCIAL

## 2023-12-19 VITALS
BODY MASS INDEX: 45.47 KG/M2 | HEART RATE: 84 BPM | DIASTOLIC BLOOD PRESSURE: 82 MMHG | OXYGEN SATURATION: 98 % | SYSTOLIC BLOOD PRESSURE: 126 MMHG | HEIGHT: 68 IN | WEIGHT: 300 LBS

## 2023-12-19 DIAGNOSIS — F42.9 OBSESSIVE-COMPULSIVE DISORDER, UNSPECIFIED TYPE: Chronic | ICD-10-CM

## 2023-12-19 DIAGNOSIS — G47.09 OTHER INSOMNIA: Chronic | ICD-10-CM

## 2023-12-19 DIAGNOSIS — F33.1 MODERATE RECURRENT MAJOR DEPRESSION: Primary | Chronic | ICD-10-CM

## 2023-12-19 PROCEDURE — 99214 OFFICE O/P EST MOD 30 MIN: CPT | Performed by: NURSE PRACTITIONER

## 2023-12-19 PROCEDURE — 1160F RVW MEDS BY RX/DR IN RCRD: CPT | Performed by: NURSE PRACTITIONER

## 2023-12-19 PROCEDURE — 1159F MED LIST DOCD IN RCRD: CPT | Performed by: NURSE PRACTITIONER

## 2023-12-19 RX ORDER — BUPROPION HYDROCHLORIDE 300 MG/1
300 TABLET ORAL DAILY
Qty: 30 TABLET | Refills: 2 | Status: SHIPPED | OUTPATIENT
Start: 2023-12-19

## 2023-12-19 RX ORDER — GABAPENTIN 300 MG/1
300 CAPSULE ORAL 3 TIMES DAILY
COMMUNITY
Start: 2023-12-14

## 2023-12-19 RX ORDER — PAROXETINE HYDROCHLORIDE 40 MG/1
40 TABLET, FILM COATED ORAL DAILY
Qty: 30 TABLET | Refills: 2 | Status: SHIPPED | OUTPATIENT
Start: 2023-12-19

## 2023-12-19 NOTE — PROGRESS NOTES
"Chief Complaint    Depression, anxiety, and intrusive thoughts with compulsions.        Subjective          Milton Taylor presents to [facility] by himself for a follow up and medication check.    History of Present Illness: Milton states, \"I am a little less than okay.\" Milton tells me he has been under a great deal of stress after his biological father fell, fractured his skull, and had two brain bleeds. He is in a rehabilitation facility after he was treated at the hospital and has still not regained all his cognitive abilities. Milton states, \"he is still not himself.\" He reports getting very upset at the hospital staff for telling him that his father was not at their facility, only to discover later that he was, but he was listed under a different name due to his cognitive impairment. The patient reports yelling, cussing, and calling the police. He apologized later to stay, but does not have regrets for his behavior. He feels more anxious and depressed than usual due to stress and the winter months. He is sleeping with Trazodone. His appetite has been increased more than when he was sick, but less than before his illness. He reports concerns for weight gain and identifies lack of exercise and not cooking meals as a potential cause.    He is taking Wellbutrin XL, Trazodone, Paxil, and Hydroxyzine Pamoate. He denies any side effects. He denies any SI/HI/AVH.   Current Medications:   Current Outpatient Medications   Medication Sig Dispense Refill    buPROPion XL (Wellbutrin XL) 300 MG 24 hr tablet Take 1 tablet by mouth Daily. 30 tablet 2    famotidine (PEPCID) 20 MG tablet Take 1 tablet by mouth Daily As Needed.      fluticasone (FLONASE) 50 MCG/ACT nasal spray 1-2 sprays into the nostril(s) as directed by provider Daily.      gabapentin (NEURONTIN) 300 MG capsule Take 1 capsule by mouth 3 (Three) Times a Day.      hydrOXYzine pamoate (VISTARIL) 25 MG capsule Take 1-2 " "capsules by mouth At Night As Needed for Anxiety. 60 capsule 2    PARoxetine (Paxil) 40 MG tablet Take 1 tablet by mouth Daily. 30 tablet 2    traZODone (DESYREL) 50 MG tablet Take 1 tablet by mouth Every Night. 30 tablet 2     No current facility-administered medications for this visit.         Objective   Vital Signs:   /82   Pulse 84   Ht 172.7 cm (68\")   Wt 136 kg (300 lb)   SpO2 98%   BMI 45.61 kg/m²     Physical Exam  Vitals and nursing note reviewed.   Constitutional:       Appearance: Normal appearance. He is obese.   Musculoskeletal:         General: Normal range of motion.   Skin:     General: Skin is warm.   Neurological:      General: No focal deficit present.      Mental Status: He is alert and oriented to person, place, and time.        The following data was reviewed by: ALESSANDRA Rodríguez on 12/19/2023:    BEHAVIORAL HEALTH - SCAN - [facility] (07/27/2023)      Assessment and Plan    Diagnoses and all orders for this visit:    1. Moderate recurrent major depression (Primary)  -     buPROPion XL (Wellbutrin XL) 300 MG 24 hr tablet; Take 1 tablet by mouth Daily.  Dispense: 30 tablet; Refill: 2  -     PARoxetine (Paxil) 40 MG tablet; Take 1 tablet by mouth Daily.  Dispense: 30 tablet; Refill: 2    2. Obsessive-compulsive disorder, unspecified type  -     PARoxetine (Paxil) 40 MG tablet; Take 1 tablet by mouth Daily.  Dispense: 30 tablet; Refill: 2    3. Other insomnia             MENTAL STATUS EXAM   General Appearance:  Cleanly groomed and dressed and well developed  Eye Contact:  Good eye contact  Attitude:  Cooperative  Motor Activity:  Slow  Muscle Strength:  Normal  Speech:  Soft spoken  Language:  Spontaneous  Mood and affect:  Normal, pleasant  Hopelessness:  2  Thought Process:  Logical, goal-directed and linear  Associations/ Thought Content:  No delusions  Hallucinations:  None  Suicidal Ideations:  Not present  Homicidal Ideation:  Not present  Sensorium:  Alert and " clear  Orientation:  Person, place, time and situation  Immediate Recall, Recent, and Remote Memory:  Intact  Attention Span/ Concentration:  Good  Fund of Knowledge:  Appropriate for age and educational level  Intellectual Functioning:  Average range  Insight:  Fair  Judgement:  Fair  Reliability:  Good  Impulse Control:  Fair     Physical/Medical Issues:  Yes History of Wernick's encephalopathy, biliary dyskinesia, scoliosis, headaches, clausterphobia, back pain, bronchitis, and arthritis      PHQ-9 Score:   PHQ-9 Total Score: 22     JEREMIE-7 Score:  JEREMIE 7 Total Score: 21    Impression/Plan:  -This is a follow up and medication check. Milton reports feeling more down and anxious recently. He is taking care of his father's affairs after he fell, injured himself, and is in rehab. He is still not himself which upsets the patient. He is sleeping, but only with Trazodone. His appetite has been more than it was after he was ill. He is concerned for weight gain, so we explored possible changes he could make in his lifestyle. He and mom do not cook, so finding healthy, convenient meals would be one way or preparing larger crockpot meals to eat on all week is another. We also discussed the benefits of increasing physical activity on mood, anxiety, and weight gain. He is motivated to make medication adjustments and believes increasing both Wellbutrin and Paxil could be helpful.  -Increase Wellbutrin XL to 300 mg daily for depression.  -Increase Paxil to 40 mg daily for depression and anxiety.  -Continue Trazodone 25-50 mg nightly for insomnia. Patient has refills.   -Continue Hydroxyzine Pamoate 25-50 mg nightly as needed for anxiety. Patient has refills.   -Consider therapy.   -Collect GeneSight test.    MEDS ORDERED DURING VISIT:  New Medications Ordered This Visit   Medications    buPROPion XL (Wellbutrin XL) 300 MG 24 hr tablet     Sig: Take 1 tablet by mouth Daily.     Dispense:  30 tablet     Refill:  2    PARoxetine  (Paxil) 40 MG tablet     Sig: Take 1 tablet by mouth Daily.     Dispense:  30 tablet     Refill:  2         Follow Up   Return in about 2 months (around 2/19/2024) for Medication Check.  Patient was given instructions and counseling regarding his condition or for health maintenance advice. Please see specific information pulled into the AVS if appropriate.       TREATMENT PLAN/GOALS: Continue supportive psychotherapy efforts and medications as indicated. Treatment and medication options discussed during today's visit. Patient acknowledged and verbally consented to continue with current treatment plan and was educated on the importance of compliance with treatment and follow-up appointments.    MEDICATION ISSUES:  Discussed medication options and treatment plan of prescribed medication as well as the risks, benefits, and side effects including potential falls, possible impaired driving and metabolic adversities among others. Patient is agreeable to call the office with any worsening of symptoms or onset of side effects. Patient is agreeable to call 911 or go to the nearest ER should he/she begin having SI/HI.        This document has been electronically signed by ALESSANDRA Ruiz, PMHNP-BC  December 19, 2023 19:34 EST    Part of this note may be an electronic transcription/translation of spoken language to printed text using the Dragon Dictation System.

## 2024-01-02 ENCOUNTER — TELEPHONE (OUTPATIENT)
Dept: PSYCHIATRY | Facility: CLINIC | Age: 29
End: 2024-01-02
Payer: COMMERCIAL

## 2024-01-02 DIAGNOSIS — F33.1 MODERATE RECURRENT MAJOR DEPRESSION: Chronic | ICD-10-CM

## 2024-01-02 RX ORDER — BUPROPION HYDROCHLORIDE 150 MG/1
150 TABLET ORAL DAILY
Qty: 30 TABLET | Refills: 2 | Status: SHIPPED | OUTPATIENT
Start: 2024-01-02

## 2024-01-02 RX ORDER — AZITHROMYCIN 250 MG/1
TABLET, FILM COATED ORAL
COMMUNITY
Start: 2023-12-28 | End: 2024-01-02

## 2024-01-02 RX ORDER — DEXAMETHASONE 4 MG/1
TABLET ORAL
COMMUNITY
Start: 2023-12-28

## 2024-01-02 NOTE — TELEPHONE ENCOUNTER
There is a likelihood it was the increase in Wellbutrin and the addition of the steroid. Does he have the 150 mg dose of Wellbutrin to resume? I would do that if needed. Use Hydroxyzine as well to help with anxiety.

## 2024-01-02 NOTE — TELEPHONE ENCOUNTER
Pt returned call. Informed pt of above. Pt stated he understood. Stated he did not have any 150mg Wellbutrin.

## 2024-01-02 NOTE — TELEPHONE ENCOUNTER
Patient called in states increase in medication is not good making his OCD worse (intrusive thoughts and compulsions) and anxiety worse. Verified med list he has been on Decadron 4 mg tablet for about 6 days just finished a Z-PACK. Please advise

## 2024-02-19 NOTE — PROGRESS NOTES
"Chief Complaint    Depression, anxiety, and intrusive thoughts with compulsions.        Subjective          Milton Taylor presents to [facility] by himself for a follow up and medication check.    History of Present Illness: Milton states, \"I am not well at all.\" Milton tells me he is having more anxiety and OCD symptoms. He tells me he cannot do anything right now due to the constant compulsion to repeat things and pray. He tells me one of his rituals can take up to an hour to complete and that is if he does not mess up and need to start over. He tells me he gets a feeling when he can stop. He spends hours daily repeating things or praying. He is not able to complete school and he does not think he would be able to work. He is considering applying for his disability income for his back or his mental health. He stopped his medications around the beginning of January due to his anxiety and intrusive thoughts worsening with change in medication, but now he realizes the medication had been helping a small amount. He is starting therapy this week at [facility]. He is still using Trazodone for sleep and Hydroxyzine as needed. He denies any side effects. He denies any SI/HI/AVH.     Current Medications:   Current Outpatient Medications   Medication Sig Dispense Refill    famotidine (PEPCID) 20 MG tablet Take 1 tablet by mouth Daily As Needed.      fluticasone (FLONASE) 50 MCG/ACT nasal spray 1-2 sprays into the nostril(s) as directed by provider Daily.      hydrOXYzine pamoate (VISTARIL) 25 MG capsule Take 1-2 capsules by mouth At Night As Needed for Anxiety. 60 capsule 2    traZODone (DESYREL) 50 MG tablet Take 1 tablet by mouth Every Night. 30 tablet 2    vitamin D (ERGOCALCIFEROL) 1.25 MG (02734 UT) capsule capsule Take 1 capsule by mouth 1 (One) Time Per Week.      fluvoxaMINE (LUVOX) 50 MG tablet Take 1 tablet by mouth Every Night for 7 days, THEN 2 tablets Every Night for 23 days. 53 " "tablet 0     No current facility-administered medications for this visit.         Objective   Vital Signs:   /76   Pulse 72   Ht 172.7 cm (68\")   Wt (!) 141 kg (311 lb)   SpO2 98%   BMI 47.29 kg/m²     Physical Exam  Vitals and nursing note reviewed.   Constitutional:       Appearance: Normal appearance. He is obese.   Musculoskeletal:         General: Normal range of motion.   Skin:     General: Skin is warm.   Neurological:      General: No focal deficit present.      Mental Status: He is alert and oriented to person, place, and time.        The following data was reviewed by: ALESSANDRA Rodríguez on 02/21/2024:    BEHAVIORAL HEALTH - SCAN - [facility] (07/27/2023)      Assessment and Plan    Diagnoses and all orders for this visit:    1. Obsessive-compulsive disorder, unspecified type (Primary)  -     fluvoxaMINE (LUVOX) 50 MG tablet; Take 1 tablet by mouth Every Night for 7 days, THEN 2 tablets Every Night for 23 days.  Dispense: 53 tablet; Refill: 0    2. Other insomnia  -     traZODone (DESYREL) 50 MG tablet; Take 1 tablet by mouth Every Night.  Dispense: 30 tablet; Refill: 2    3. Moderate recurrent major depression    4. JEREMIE (generalized anxiety disorder)               MENTAL STATUS EXAM   General Appearance:  Cleanly groomed and dressed and well developed  Eye Contact:  Good eye contact  Attitude:  Cooperative  Motor Activity:  Slow  Muscle Strength:  Normal  Speech:  Soft spoken  Language:  Spontaneous  Mood and affect:  Anxious and depressed  Hopelessness:  6  Thought Process:  Logical, goal-directed and linear  Associations/ Thought Content:  No delusions  Hallucinations:  None  Suicidal Ideations:  Not present  Homicidal Ideation:  Not present  Sensorium:  Alert and clear  Orientation:  Person, place, time and situation  Immediate Recall, Recent, and Remote Memory:  Intact  Attention Span/ Concentration:  Good  Fund of Knowledge:  Appropriate for age and educational " level  Intellectual Functioning:  Average range  Insight:  Fair  Judgement:  Fair  Reliability:  Good  Impulse Control:  Fair     Physical/Medical Issues:  Yes History of Wernick's encephalopathy, biliary dyskinesia, scoliosis, headaches, clausterphobia, back pain, bronchitis, and arthritis      PHQ-9 Score:   PHQ-9 Total Score: 21     JEREMIE-7 Score:  JEREMIE 7 Total Score: 21    Impression/Plan:  -This is a follow up and medication check. Milton reports worsening symptoms of anxiety and OCD. He reports hours of engaging in rituals that are interfering with his ability to work and function with school. He is considering applying for his disability. He is aware it may be very difficult to get at his age for physical or mental health reasons, but he is willing to try. He is aware the amount of income he will receive is limited and will keep him from working in the future more than part-time hours. He is also applying for food stamps and request I complete a form to help him receive the food stamps. He is overwhelmed with worry about his symptoms and how they have worsened since stopping his medication. He is interested in trying another medication like Luvox which is the gold-standard for OCD treatment, with therapy. He is starting therapy at [facility]. I reinforced the importance of this as well.   -Stop Wellbutrin XL and Paxil due to patient's perceived worsening of mood.  -Initiate Luvox 50 mg nightly for a week and then increase to 100 mg nightly for depression and anxiety.  -Continue Trazodone 25-50 mg nightly for insomnia.   -Continue Hydroxyzine Pamoate 25-50 mg nightly as needed for anxiety. Patient has refills.   -Consider therapy.   -Collect "[employer] test.    MEDS ORDERED DURING VISIT:  New Medications Ordered This Visit   Medications    fluvoxaMINE (LUVOX) 50 MG tablet     Sig: Take 1 tablet by mouth Every Night for 7 days, THEN 2 tablets Every Night for 23 days.     Dispense:  53 tablet     Refill:  0    traZODone  (DESYREL) 50 MG tablet     Sig: Take 1 tablet by mouth Every Night.     Dispense:  30 tablet     Refill:  2         Follow Up   Return in about 6 weeks (around 4/3/2024) for Medication Check.  Patient was given instructions and counseling regarding his condition or for health maintenance advice. Please see specific information pulled into the AVS if appropriate.       TREATMENT PLAN/GOALS: Continue supportive psychotherapy efforts and medications as indicated. Treatment and medication options discussed during today's visit. Patient acknowledged and verbally consented to continue with current treatment plan and was educated on the importance of compliance with treatment and follow-up appointments.    MEDICATION ISSUES:  Discussed medication options and treatment plan of prescribed medication as well as the risks, benefits, and side effects including potential falls, possible impaired driving and metabolic adversities among others. Patient is agreeable to call the office with any worsening of symptoms or onset of side effects. Patient is agreeable to call 911 or go to the nearest ER should he/she begin having SI/HI.        This document has been electronically signed by ALESSANDRA Ruiz, PMHNP-BC  February 21, 2024 19:34 EST    Part of this note may be an electronic transcription/translation of spoken language to printed text using the Dragon Dictation System.

## 2024-02-21 ENCOUNTER — OFFICE VISIT (OUTPATIENT)
Dept: PSYCHIATRY | Facility: CLINIC | Age: 29
End: 2024-02-21
Payer: COMMERCIAL

## 2024-02-21 VITALS
BODY MASS INDEX: 47.13 KG/M2 | OXYGEN SATURATION: 98 % | SYSTOLIC BLOOD PRESSURE: 122 MMHG | WEIGHT: 311 LBS | DIASTOLIC BLOOD PRESSURE: 76 MMHG | HEART RATE: 72 BPM | HEIGHT: 68 IN

## 2024-02-21 DIAGNOSIS — F33.1 MODERATE RECURRENT MAJOR DEPRESSION: Chronic | ICD-10-CM

## 2024-02-21 DIAGNOSIS — F42.9 OBSESSIVE-COMPULSIVE DISORDER, UNSPECIFIED TYPE: Primary | Chronic | ICD-10-CM

## 2024-02-21 DIAGNOSIS — G47.09 OTHER INSOMNIA: Chronic | ICD-10-CM

## 2024-02-21 DIAGNOSIS — F41.1 GAD (GENERALIZED ANXIETY DISORDER): Chronic | ICD-10-CM

## 2024-02-21 PROCEDURE — 1159F MED LIST DOCD IN RCRD: CPT | Performed by: NURSE PRACTITIONER

## 2024-02-21 PROCEDURE — 99214 OFFICE O/P EST MOD 30 MIN: CPT | Performed by: NURSE PRACTITIONER

## 2024-02-21 PROCEDURE — 1160F RVW MEDS BY RX/DR IN RCRD: CPT | Performed by: NURSE PRACTITIONER

## 2024-02-21 RX ORDER — ERGOCALCIFEROL 1.25 MG/1
1 CAPSULE ORAL WEEKLY
COMMUNITY
Start: 2024-02-02

## 2024-02-21 RX ORDER — TRAZODONE HYDROCHLORIDE 50 MG/1
50 TABLET ORAL NIGHTLY
Qty: 30 TABLET | Refills: 2 | Status: SHIPPED | OUTPATIENT
Start: 2024-02-21

## 2024-02-21 RX ORDER — FLUVOXAMINE MALEATE 50 MG/1
TABLET, COATED ORAL
Qty: 53 TABLET | Refills: 0 | Status: SHIPPED | OUTPATIENT
Start: 2024-02-21 | End: 2024-03-22

## 2024-02-22 ENCOUNTER — TELEPHONE (OUTPATIENT)
Dept: PSYCHIATRY | Facility: CLINIC | Age: 29
End: 2024-02-22
Payer: COMMERCIAL

## 2024-02-22 NOTE — TELEPHONE ENCOUNTER
Called and left a message for Milton to let him know the disability form is completed. If Milton calls back, please ask if he would like to  or us email this to him. Thank you.

## 2024-02-29 ENCOUNTER — TELEPHONE (OUTPATIENT)
Dept: PSYCHIATRY | Facility: CLINIC | Age: 29
End: 2024-02-29
Payer: COMMERCIAL

## 2024-02-29 NOTE — TELEPHONE ENCOUNTER
We have received a record request from [facility], for the last few office notes and a medication list. Looks like Milton is now seeing a provider there. This request has been completed. Thank you.

## 2024-03-28 DIAGNOSIS — F42.9 OBSESSIVE-COMPULSIVE DISORDER, UNSPECIFIED TYPE: Chronic | ICD-10-CM

## 2024-03-28 RX ORDER — FLUVOXAMINE MALEATE 50 MG/1
TABLET, COATED ORAL
Qty: 53 TABLET | Refills: 0 | OUTPATIENT
Start: 2024-03-28 | End: 2024-04-27

## (undated) DEVICE — MONOPOLAR METZENBAUM SCISSOR, MINI BLADE TIP, DISPOSABLE: Brand: MONOPOLAR METZENBAUM SCISSOR, MINI BLADE TIP, DISPOSABLE

## (undated) DEVICE — GLV SURG SENSICARE W/ALOE PF LF 8.5 STRL

## (undated) DEVICE — ANTIBACTERIAL UNDYED BRAIDED (POLYGLACTIN 910), SYNTHETIC ABSORBABLE SUTURE: Brand: COATED VICRYL

## (undated) DEVICE — 2, DISPOSABLE SUCTION/IRRIGATOR WITHOUT DISPOSABLE TIP: Brand: STRYKEFLOW

## (undated) DEVICE — SUT VIC 0/0 UR6 27IN DYED J603H

## (undated) DEVICE — KT CATH CHOLANGIOGRA PERC W/BALLO

## (undated) DEVICE — SLV SCD CALF HEMOFORCE DVT THERP REPROC MD

## (undated) DEVICE — CLAVICLE STRAP: Brand: DEROYAL

## (undated) DEVICE — ENDOPATH XCEL UNIVERSAL TROCAR STABLILITY SLEEVES: Brand: ENDOPATH XCEL

## (undated) DEVICE — DISPOSABLE MONOPOLAR ENDOSCOPIC CORD 10 FT. (3M): Brand: KIRWAN

## (undated) DEVICE — PDS II VLT 0 107CM AG ST3: Brand: ENDOLOOP

## (undated) DEVICE — ENDOPATH XCEL BLADELESS TROCARS WITH STABILITY SLEEVES: Brand: ENDOPATH XCEL

## (undated) DEVICE — GLV SURG TRIUMPH MICRO PF LTX 7.5 STRL

## (undated) DEVICE — RICH GENERAL LAPAROSCOPY: Brand: MEDLINE INDUSTRIES, INC.

## (undated) DEVICE — UNDYED BRAIDED (POLYGLACTIN 910), SYNTHETIC ABSORBABLE SUTURE: Brand: COATED VICRYL

## (undated) DEVICE — BLD CLIP UNIV SURG GRY

## (undated) DEVICE — TP ELECTRD LAP L WR SPLIT33CM